# Patient Record
Sex: FEMALE | Race: WHITE | NOT HISPANIC OR LATINO | URBAN - METROPOLITAN AREA
[De-identification: names, ages, dates, MRNs, and addresses within clinical notes are randomized per-mention and may not be internally consistent; named-entity substitution may affect disease eponyms.]

---

## 2018-05-21 ENCOUNTER — EMERGENCY (EMERGENCY)
Facility: HOSPITAL | Age: 83
LOS: 1 days | Discharge: HOME | End: 2018-05-21
Attending: EMERGENCY MEDICINE
Payer: COMMERCIAL

## 2018-05-21 VITALS
RESPIRATION RATE: 18 BRPM | HEART RATE: 82 BPM | SYSTOLIC BLOOD PRESSURE: 128 MMHG | OXYGEN SATURATION: 97 % | TEMPERATURE: 98 F | DIASTOLIC BLOOD PRESSURE: 60 MMHG

## 2018-05-21 VITALS
HEART RATE: 110 BPM | WEIGHT: 80.03 LBS | DIASTOLIC BLOOD PRESSURE: 69 MMHG | OXYGEN SATURATION: 95 % | TEMPERATURE: 98 F | SYSTOLIC BLOOD PRESSURE: 117 MMHG | RESPIRATION RATE: 24 BRPM

## 2018-05-21 DIAGNOSIS — R11.10 VOMITING, UNSPECIFIED: ICD-10-CM

## 2018-05-21 LAB
ALBUMIN SERPL ELPH-MCNC: 2.8 G/DL — LOW (ref 3.3–5)
ALP SERPL-CCNC: 80 U/L — SIGNIFICANT CHANGE UP (ref 40–120)
ALT FLD-CCNC: 11 U/L — LOW (ref 12–78)
ANION GAP SERPL CALC-SCNC: 7 MMOL/L — SIGNIFICANT CHANGE UP (ref 5–17)
APTT BLD: 25.2 SEC — LOW (ref 27.5–37.4)
AST SERPL-CCNC: 33 U/L — SIGNIFICANT CHANGE UP (ref 15–37)
BASOPHILS # BLD AUTO: 0.1 K/UL — SIGNIFICANT CHANGE UP (ref 0–0.2)
BASOPHILS NFR BLD AUTO: 0.6 % — SIGNIFICANT CHANGE UP (ref 0–2)
BILIRUB SERPL-MCNC: 0.5 MG/DL — SIGNIFICANT CHANGE UP (ref 0.2–1.2)
BUN SERPL-MCNC: 20 MG/DL — SIGNIFICANT CHANGE UP (ref 7–23)
CALCIUM SERPL-MCNC: 8.6 MG/DL — SIGNIFICANT CHANGE UP (ref 8.5–10.1)
CHLORIDE SERPL-SCNC: 95 MMOL/L — LOW (ref 96–108)
CO2 SERPL-SCNC: 29 MMOL/L — SIGNIFICANT CHANGE UP (ref 22–31)
CREAT SERPL-MCNC: 0.56 MG/DL — SIGNIFICANT CHANGE UP (ref 0.5–1.3)
EOSINOPHIL # BLD AUTO: 0.1 K/UL — SIGNIFICANT CHANGE UP (ref 0–0.5)
EOSINOPHIL NFR BLD AUTO: 0.8 % — SIGNIFICANT CHANGE UP (ref 0–6)
GLUCOSE SERPL-MCNC: 99 MG/DL — SIGNIFICANT CHANGE UP (ref 70–99)
HCT VFR BLD CALC: 39.2 % — SIGNIFICANT CHANGE UP (ref 34.5–45)
HGB BLD-MCNC: 13.2 G/DL — SIGNIFICANT CHANGE UP (ref 11.5–15.5)
INR BLD: 1.13 RATIO — SIGNIFICANT CHANGE UP (ref 0.88–1.16)
LYMPHOCYTES # BLD AUTO: 0.8 K/UL — LOW (ref 1–3.3)
LYMPHOCYTES # BLD AUTO: 8.5 % — LOW (ref 13–44)
MCHC RBC-ENTMCNC: 30 PG — SIGNIFICANT CHANGE UP (ref 27–34)
MCHC RBC-ENTMCNC: 33.5 GM/DL — SIGNIFICANT CHANGE UP (ref 32–36)
MCV RBC AUTO: 89.3 FL — SIGNIFICANT CHANGE UP (ref 80–100)
MONOCYTES # BLD AUTO: 0.9 K/UL — SIGNIFICANT CHANGE UP (ref 0–0.9)
MONOCYTES NFR BLD AUTO: 9.8 % — HIGH (ref 1–9)
NEUTROPHILS # BLD AUTO: 7.6 K/UL — HIGH (ref 1.8–7.4)
NEUTROPHILS NFR BLD AUTO: 80.2 % — HIGH (ref 43–77)
PLATELET # BLD AUTO: 281 K/UL — SIGNIFICANT CHANGE UP (ref 150–400)
POTASSIUM SERPL-MCNC: 5 MMOL/L — SIGNIFICANT CHANGE UP (ref 3.5–5.3)
POTASSIUM SERPL-SCNC: 5 MMOL/L — SIGNIFICANT CHANGE UP (ref 3.5–5.3)
PROT SERPL-MCNC: 7.4 G/DL — SIGNIFICANT CHANGE UP (ref 6–8.3)
PROTHROM AB SERPL-ACNC: 12.3 SEC — SIGNIFICANT CHANGE UP (ref 9.8–12.7)
RBC # BLD: 4.39 M/UL — SIGNIFICANT CHANGE UP (ref 3.8–5.2)
RBC # FLD: 12.3 % — SIGNIFICANT CHANGE UP (ref 10.3–14.5)
SODIUM SERPL-SCNC: 131 MMOL/L — LOW (ref 135–145)
WBC # BLD: 9.5 K/UL — SIGNIFICANT CHANGE UP (ref 3.8–10.5)
WBC # FLD AUTO: 9.5 K/UL — SIGNIFICANT CHANGE UP (ref 3.8–10.5)

## 2018-05-21 PROCEDURE — 99284 EMERGENCY DEPT VISIT MOD MDM: CPT | Mod: 25

## 2018-05-21 PROCEDURE — 80053 COMPREHEN METABOLIC PANEL: CPT

## 2018-05-21 PROCEDURE — 71045 X-RAY EXAM CHEST 1 VIEW: CPT

## 2018-05-21 PROCEDURE — 93005 ELECTROCARDIOGRAM TRACING: CPT

## 2018-05-21 PROCEDURE — 85730 THROMBOPLASTIN TIME PARTIAL: CPT

## 2018-05-21 PROCEDURE — 99285 EMERGENCY DEPT VISIT HI MDM: CPT

## 2018-05-21 PROCEDURE — 85027 COMPLETE CBC AUTOMATED: CPT

## 2018-05-21 PROCEDURE — 71045 X-RAY EXAM CHEST 1 VIEW: CPT | Mod: 26

## 2018-05-21 PROCEDURE — 85610 PROTHROMBIN TIME: CPT

## 2018-05-21 PROCEDURE — 96374 THER/PROPH/DIAG INJ IV PUSH: CPT

## 2018-05-21 RX ORDER — FAMOTIDINE 10 MG/ML
20 INJECTION INTRAVENOUS ONCE
Qty: 0 | Refills: 0 | Status: DISCONTINUED | OUTPATIENT
Start: 2018-05-21 | End: 2018-05-21

## 2018-05-21 RX ORDER — SODIUM CHLORIDE 9 MG/ML
1000 INJECTION INTRAMUSCULAR; INTRAVENOUS; SUBCUTANEOUS ONCE
Qty: 0 | Refills: 0 | Status: COMPLETED | OUTPATIENT
Start: 2018-05-21 | End: 2018-05-21

## 2018-05-21 RX ORDER — PANTOPRAZOLE SODIUM 20 MG/1
40 TABLET, DELAYED RELEASE ORAL ONCE
Qty: 0 | Refills: 0 | Status: COMPLETED | OUTPATIENT
Start: 2018-05-21 | End: 2018-05-21

## 2018-05-21 RX ADMIN — PANTOPRAZOLE SODIUM 40 MILLIGRAM(S): 20 TABLET, DELAYED RELEASE ORAL at 12:59

## 2018-05-21 RX ADMIN — SODIUM CHLORIDE 1000 MILLILITER(S): 9 INJECTION INTRAMUSCULAR; INTRAVENOUS; SUBCUTANEOUS at 13:59

## 2018-05-21 RX ADMIN — SODIUM CHLORIDE 1000 MILLILITER(S): 9 INJECTION INTRAMUSCULAR; INTRAVENOUS; SUBCUTANEOUS at 12:59

## 2018-05-21 NOTE — ED ADULT NURSE NOTE - OBJECTIVE STATEMENT
has not been to a doctor in 25 years comes in with multiple complaints including "black" vomit 2-3 days ago denies any further vomiting c/o mild nausea, no appetite but can drink and tolerate water well denies diarrhea also c/o b/l upper back pain for about a week that has been intermittent and only comes with activity and movement also c/o pain to b/l plantar feet even with gentle palpation pt has good b/l pedal pulses

## 2018-05-21 NOTE — CONSULT NOTE ADULT - SUBJECTIVE AND OBJECTIVE BOX
Chief Complaint:  Patient is a 83y old  Female who presents with a chief complaint of   No pertinent past medical history  No significant past surgical history     HPI: pending    GI consulted for ? hematemesis. Pt seen and examined, family at bedside. Denies prior hx of GI conditions or other significant pmhx. Reports vomiting which started 3 days ago and ended yesterday. Reports one episode of dark tinged vomitus with phlegm, denies actual coffee ground emesis or brb. Unsure of exactly what she ate. Endorses associated chills and recent sick contacts (great grandkids). Last bm yesterday and normal, brown in color, denies melena/hematochezia. Denies fevers, nausea, abd pain. Upon eval in ED states she feels much better, symptoms resolved, no further episodes of emesis, and that she wants to eat macaroni salad.  prior scopes- denies  abd sx- denies  fhx- denies gi related cancers  toxic habits- denies, former smoker, recently quit  meds- n/c    currently in ed afebrile, tachycardia resolved s/p ivf  no leukocytosis h/h wnl, plts wnl inr 1.13    No Known Allergies            FAMILY HISTORY: see above        Review of Systems:    General:  No wt loss, fevers, chills, night sweats, fatigue  Eyes:  Good vision, no reported pain  ENT:  No sore throat, pain, runny nose, dysphagia  CV:  No pain, palpitations, no lightheadedness  Resp:  No dyspnea, cough, tachypnea, wheezing  GI: see above  :  No pain, bleeding, incontinence, nocturia  Muscle:  No pain, weakness  Neuro:  No weakness, tingling, memory problems  Psych:  No fatigue, insomnia, mood problems, depression  Endocrine:  No polyuria, polydypsia, cold/heat intolerance  Heme:  No petechiae, ecchymosis, easy bruisability  Skin:  No rash, tattoos, scars, edema    Relevant Family History:   n/c    Relevant Social History: n/c      Physical Exam:    Vital Signs:  Vital Signs Last 24 Hrs  T(C): 36.4 (21 May 2018 14:04), Max: 36.4 (21 May 2018 11:39)  T(F): 97.6 (21 May 2018 14:04), Max: 97.6 (21 May 2018 14:04)  HR: 88 (21 May 2018 14:04) (88 - 110)  BP: 126/68 (21 May 2018 14:04) (117/69 - 126/68)  BP(mean): --  RR: 20 (21 May 2018 14:04) (20 - 24)  SpO2: 96% (21 May 2018 14:04) (95% - 96%)  Daily     Daily     General: lying in bed in nad, non toxic appearing, frail  HEENT:  NC/AT,  conjunctivae clear and pink, anicteric  Chest:  grossly cta  Cardiovascular:  Regular rhythm, S1, S2  Abdomen:  Soft, non-tender, non-distended, normoactive bowel sounds  Extremities:  no edema  Skin:  No rash  Neuro/Psych:  Awake alert responds appropriately    Laboratory:                            13.2   9.5   )-----------( 281      ( 21 May 2018 12:52 )             39.2     05-21    131<L>  |  95<L>  |  20  ----------------------------<  99  5.0   |  29  |  0.56    Ca    8.6      21 May 2018 12:52    TPro  7.4  /  Alb  2.8<L>  /  TBili  0.5  /  DBili  x   /  AST  33  /  ALT  11<L>  /  AlkPhos  80  05-21    LIVER FUNCTIONS - ( 21 May 2018 12:52 )  Alb: 2.8 g/dL / Pro: 7.4 g/dL / ALK PHOS: 80 U/L / ALT: 11 U/L / AST: 33 U/L / GGT: x           PT/INR - ( 21 May 2018 12:52 )   PT: 12.3 sec;   INR: 1.13 ratio         PTT - ( 21 May 2018 12:52 )  PTT:25.2 sec      Imaging:

## 2018-05-21 NOTE — ED PROVIDER NOTE - PROGRESS NOTE DETAILS
feels much better after IV fluids, GI seen patient, cleared by Dr. Barkley, patient will f/u as outpatient, tolerating PO

## 2018-05-21 NOTE — ED PROVIDER NOTE - OBJECTIVE STATEMENT
84 yo female no pmhx c/o 1 episode of hematemesis, stating she vomited "black" yesterday, but no episodes today.  No fever/chills.  States she otherwise feels well. PMD Dr. Snowden. No GI.  No dizziness, no syncope. No abdominal pain

## 2018-05-21 NOTE — CONSULT NOTE ADULT - PROBLEM SELECTOR RECOMMENDATION 9
questionable hematemesis  pt/family deny overt coffee grounds or brb  suspect possible gastroenteritis which has resolved based on hx  s/p ivf/pepcid in ED  currently asymptomatic, symptoms resolved, afebrile, vss, h/h stable  d/w dr nugent, can be dcd from gi perspective and follow up as outpatient in GI office   to return to ED for eval if with overt hematemesis, abd pain, melena

## 2018-05-21 NOTE — ED PROVIDER NOTE - CONSTITUTIONAL, MLM
normal... thin appearing, well appearing, awake, alert, oriented to person, place, time/situation and in no apparent distress.

## 2018-05-21 NOTE — CONSULT NOTE ADULT - ASSESSMENT
84yo female with no reported significant pmhx, presented to ED for vomiting. GI consulted for questionable hematemesis.

## 2018-11-20 ENCOUNTER — EMERGENCY (EMERGENCY)
Facility: HOSPITAL | Age: 83
LOS: 1 days | Discharge: HOME | End: 2018-11-20
Attending: EMERGENCY MEDICINE
Payer: MEDICARE

## 2018-11-20 VITALS — DIASTOLIC BLOOD PRESSURE: 87 MMHG | SYSTOLIC BLOOD PRESSURE: 154 MMHG | HEART RATE: 75 BPM | OXYGEN SATURATION: 96 %

## 2018-11-20 VITALS — HEIGHT: 63 IN | WEIGHT: 80.03 LBS

## 2018-11-20 LAB
ALBUMIN SERPL ELPH-MCNC: 3 G/DL — LOW (ref 3.3–5)
ALP SERPL-CCNC: 72 U/L — SIGNIFICANT CHANGE UP (ref 40–120)
ALT FLD-CCNC: 12 U/L — SIGNIFICANT CHANGE UP (ref 12–78)
ANION GAP SERPL CALC-SCNC: 8 MMOL/L — SIGNIFICANT CHANGE UP (ref 5–17)
APTT BLD: 23.3 SEC — LOW (ref 28.5–37)
AST SERPL-CCNC: 15 U/L — SIGNIFICANT CHANGE UP (ref 15–37)
BASOPHILS # BLD AUTO: 0.02 K/UL — SIGNIFICANT CHANGE UP (ref 0–0.2)
BASOPHILS NFR BLD AUTO: 0.4 % — SIGNIFICANT CHANGE UP (ref 0–2)
BILIRUB SERPL-MCNC: 0.3 MG/DL — SIGNIFICANT CHANGE UP (ref 0.2–1.2)
BUN SERPL-MCNC: 15 MG/DL — SIGNIFICANT CHANGE UP (ref 7–23)
CALCIUM SERPL-MCNC: 8.3 MG/DL — LOW (ref 8.5–10.1)
CHLORIDE SERPL-SCNC: 100 MMOL/L — SIGNIFICANT CHANGE UP (ref 96–108)
CO2 SERPL-SCNC: 29 MMOL/L — SIGNIFICANT CHANGE UP (ref 22–31)
CREAT SERPL-MCNC: 0.63 MG/DL — SIGNIFICANT CHANGE UP (ref 0.5–1.3)
EOSINOPHIL # BLD AUTO: 0.06 K/UL — SIGNIFICANT CHANGE UP (ref 0–0.5)
EOSINOPHIL NFR BLD AUTO: 1.1 % — SIGNIFICANT CHANGE UP (ref 0–6)
GLUCOSE SERPL-MCNC: 94 MG/DL — SIGNIFICANT CHANGE UP (ref 70–99)
HCT VFR BLD CALC: 39.3 % — SIGNIFICANT CHANGE UP (ref 34.5–45)
HGB BLD-MCNC: 12.4 G/DL — SIGNIFICANT CHANGE UP (ref 11.5–15.5)
IMM GRANULOCYTES NFR BLD AUTO: 0.4 % — SIGNIFICANT CHANGE UP (ref 0–1.5)
INR BLD: 1.02 RATIO — SIGNIFICANT CHANGE UP (ref 0.88–1.16)
LYMPHOCYTES # BLD AUTO: 0.5 K/UL — LOW (ref 1–3.3)
LYMPHOCYTES # BLD AUTO: 8.8 % — LOW (ref 13–44)
MCHC RBC-ENTMCNC: 27.4 PG — SIGNIFICANT CHANGE UP (ref 27–34)
MCHC RBC-ENTMCNC: 31.6 GM/DL — LOW (ref 32–36)
MCV RBC AUTO: 86.8 FL — SIGNIFICANT CHANGE UP (ref 80–100)
MONOCYTES # BLD AUTO: 0.4 K/UL — SIGNIFICANT CHANGE UP (ref 0–0.9)
MONOCYTES NFR BLD AUTO: 7.1 % — SIGNIFICANT CHANGE UP (ref 2–14)
NEUTROPHILS # BLD AUTO: 4.66 K/UL — SIGNIFICANT CHANGE UP (ref 1.8–7.4)
NEUTROPHILS NFR BLD AUTO: 82.2 % — HIGH (ref 43–77)
NRBC # BLD: 0 /100 WBCS — SIGNIFICANT CHANGE UP (ref 0–0)
PLATELET # BLD AUTO: 266 K/UL — SIGNIFICANT CHANGE UP (ref 150–400)
POTASSIUM SERPL-MCNC: 4.2 MMOL/L — SIGNIFICANT CHANGE UP (ref 3.5–5.3)
POTASSIUM SERPL-SCNC: 4.2 MMOL/L — SIGNIFICANT CHANGE UP (ref 3.5–5.3)
PROT SERPL-MCNC: 7.2 G/DL — SIGNIFICANT CHANGE UP (ref 6–8.3)
PROTHROM AB SERPL-ACNC: 11.6 SEC — SIGNIFICANT CHANGE UP (ref 10–12.9)
RBC # BLD: 4.53 M/UL — SIGNIFICANT CHANGE UP (ref 3.8–5.2)
RBC # FLD: 14.4 % — SIGNIFICANT CHANGE UP (ref 10.3–14.5)
SODIUM SERPL-SCNC: 137 MMOL/L — SIGNIFICANT CHANGE UP (ref 135–145)
WBC # BLD: 5.66 K/UL — SIGNIFICANT CHANGE UP (ref 3.8–10.5)
WBC # FLD AUTO: 5.66 K/UL — SIGNIFICANT CHANGE UP (ref 3.8–10.5)

## 2018-11-20 PROCEDURE — 71275 CT ANGIOGRAPHY CHEST: CPT | Mod: 26

## 2018-11-20 PROCEDURE — 99285 EMERGENCY DEPT VISIT HI MDM: CPT

## 2018-11-20 PROCEDURE — 85730 THROMBOPLASTIN TIME PARTIAL: CPT

## 2018-11-20 PROCEDURE — 36415 COLL VENOUS BLD VENIPUNCTURE: CPT

## 2018-11-20 PROCEDURE — 74174 CTA ABD&PLVS W/CONTRAST: CPT

## 2018-11-20 PROCEDURE — 80053 COMPREHEN METABOLIC PANEL: CPT

## 2018-11-20 PROCEDURE — 85027 COMPLETE CBC AUTOMATED: CPT

## 2018-11-20 PROCEDURE — 71046 X-RAY EXAM CHEST 2 VIEWS: CPT

## 2018-11-20 PROCEDURE — 96374 THER/PROPH/DIAG INJ IV PUSH: CPT | Mod: XU

## 2018-11-20 PROCEDURE — 71046 X-RAY EXAM CHEST 2 VIEWS: CPT | Mod: 26

## 2018-11-20 PROCEDURE — 93005 ELECTROCARDIOGRAM TRACING: CPT

## 2018-11-20 PROCEDURE — 93010 ELECTROCARDIOGRAM REPORT: CPT

## 2018-11-20 PROCEDURE — 85610 PROTHROMBIN TIME: CPT

## 2018-11-20 PROCEDURE — 99284 EMERGENCY DEPT VISIT MOD MDM: CPT | Mod: 25

## 2018-11-20 PROCEDURE — 74174 CTA ABD&PLVS W/CONTRAST: CPT | Mod: 26

## 2018-11-20 PROCEDURE — 71275 CT ANGIOGRAPHY CHEST: CPT

## 2018-11-20 RX ORDER — SODIUM CHLORIDE 9 MG/ML
3 INJECTION INTRAMUSCULAR; INTRAVENOUS; SUBCUTANEOUS ONCE
Qty: 0 | Refills: 0 | Status: COMPLETED | OUTPATIENT
Start: 2018-11-20 | End: 2018-11-20

## 2018-11-20 RX ORDER — SODIUM CHLORIDE 9 MG/ML
1000 INJECTION INTRAMUSCULAR; INTRAVENOUS; SUBCUTANEOUS ONCE
Qty: 0 | Refills: 0 | Status: COMPLETED | OUTPATIENT
Start: 2018-11-20 | End: 2018-11-20

## 2018-11-20 RX ORDER — MORPHINE SULFATE 50 MG/1
2 CAPSULE, EXTENDED RELEASE ORAL ONCE
Qty: 0 | Refills: 0 | Status: DISCONTINUED | OUTPATIENT
Start: 2018-11-20 | End: 2018-11-20

## 2018-11-20 RX ADMIN — MORPHINE SULFATE 2 MILLIGRAM(S): 50 CAPSULE, EXTENDED RELEASE ORAL at 11:48

## 2018-11-20 RX ADMIN — MORPHINE SULFATE 2 MILLIGRAM(S): 50 CAPSULE, EXTENDED RELEASE ORAL at 13:39

## 2018-11-20 RX ADMIN — SODIUM CHLORIDE 3 MILLILITER(S): 9 INJECTION INTRAMUSCULAR; INTRAVENOUS; SUBCUTANEOUS at 11:02

## 2018-11-20 RX ADMIN — SODIUM CHLORIDE 1000 MILLILITER(S): 9 INJECTION INTRAMUSCULAR; INTRAVENOUS; SUBCUTANEOUS at 11:02

## 2018-11-20 NOTE — ED ADULT NURSE NOTE - NSIMPLEMENTINTERV_GEN_ALL_ED
Implemented All Universal Safety Interventions:  [place] to call system. Call bell, personal items and telephone within reach. Instruct patient to call for assistance. Room bathroom lighting operational. Non-slip footwear when patient is off stretcher. Physically safe environment: no spills, clutter or unnecessary equipment. Stretcher in lowest position, wheels locked, appropriate side rails in place.

## 2018-11-20 NOTE — ED PROVIDER NOTE - CARE PLAN
Principal Discharge DX:	Acute right-sided thoracic back pain  Secondary Diagnosis:	Thoracic aortic aneurysm without rupture

## 2018-11-20 NOTE — ED PROVIDER NOTE - PROGRESS NOTE DETAILS
Murali thoracic, Dr Roca, should call [facility] for cardiothoracic. Murali pt, doing well, no acute changes Murali Chaudhry at [facility] cardiothoracic. He went over images on his computer of CTA. States this is chronic, no need for transfer. pt can be dc home, can fu with him in office.   Patient doing well, no acute complaints. Discussed with patient about the nature of the back pain and the importance of outpatient follow-up for continued workup, evaluation and definitive diagnosis.  Discussed back pain and neurologic precautions including numbness, tingling, weakness, fever, and changes in bowel/bladder.  An opportunity to ask questions was given . Understanding of all instructions and precautions was verbalized and the patient will follow-up as outpatient as soon as possible. Patient also understands the possibility of needing additional imaging,. Patient will return with any changes, concerns, or any worsening / persistent symptoms.

## 2018-11-20 NOTE — ED PROVIDER NOTE - MUSCULOSKELETAL, MLM
Spine appears normal, no midline spinal tend (c,t,l). No r lat upper back tend / rash at site of pain. range of motion is not limited, no muscle or joint tenderness

## 2018-11-20 NOTE — ED PROVIDER NOTE - NSFOLLOWUPINSTRUCTIONS_ED_ALL_ED_FT
1) Follow-up with your Primary Medical Doctor or referred doctor. Call today / next business day for prompt follow-up.  2) Return to Emergency room for any worsening or persistent pain, weakness, fever, or any other concerning symptoms.  3) See attached instruction sheets for additional information, including information regarding signs and symptoms to look out for, reasons to seek immediate care and other important instructions.  4) Follow-up with Dr Sorensen [phone]  5) Percocet 0.5 tabs every 6 hours as needed, for pain

## 2018-11-20 NOTE — ED PROVIDER NOTE - CHPI ED SYMPTOMS NEG
no tingling/no motor function loss/no numbness/no constipation/no difficulty bearing weight/no bladder dysfunction/no anorexia/no bowel dysfunction/no fatigue

## 2018-11-20 NOTE — ED PROVIDER NOTE - OBJECTIVE STATEMENT
84 yo F p/w R mid back pain x past ~ 3- 4 days. Inc pain with deep insp. No fall / trauma. NO rad of pain to arms / legs. no numb/ting/focal weak. no fever/chills. no cough/sputum. no numb/ting/focal weak. No abd pain. NO n/v/d. NO agg/allev factors. No other inj or co.

## 2018-11-20 NOTE — ED PROVIDER NOTE - CARE PROVIDER_API CALL
Seb Sorensen), Surgery; Thoracic Surgery  [address] [place] [address]  Phone: [phone]  Fax: [phone]

## 2019-05-16 ENCOUNTER — INPATIENT (INPATIENT)
Facility: HOSPITAL | Age: 84
LOS: 1 days | Discharge: HOME | DRG: 287 | End: 2019-05-18
Attending: GENERAL ACUTE CARE HOSPITAL | Admitting: INTERNAL MEDICINE
Payer: COMMERCIAL

## 2019-05-16 VITALS
TEMPERATURE: 98 F | HEART RATE: 93 BPM | HEIGHT: 62 IN | OXYGEN SATURATION: 95 % | WEIGHT: 72.09 LBS | DIASTOLIC BLOOD PRESSURE: 74 MMHG | SYSTOLIC BLOOD PRESSURE: 116 MMHG | RESPIRATION RATE: 18 BRPM

## 2019-05-16 LAB
ALBUMIN SERPL ELPH-MCNC: 3.3 G/DL — SIGNIFICANT CHANGE UP (ref 3.3–5.2)
ALP SERPL-CCNC: 75 U/L — SIGNIFICANT CHANGE UP (ref 40–120)
ALT FLD-CCNC: 24 U/L — SIGNIFICANT CHANGE UP
ANION GAP SERPL CALC-SCNC: 14 MMOL/L — SIGNIFICANT CHANGE UP (ref 5–17)
APTT BLD: 23.9 SEC — LOW (ref 27.5–36.3)
AST SERPL-CCNC: 43 U/L — HIGH
BASOPHILS # BLD AUTO: 0 K/UL — SIGNIFICANT CHANGE UP (ref 0–0.2)
BASOPHILS NFR BLD AUTO: 0.1 % — SIGNIFICANT CHANGE UP (ref 0–2)
BILIRUB SERPL-MCNC: 0.4 MG/DL — SIGNIFICANT CHANGE UP (ref 0.4–2)
BUN SERPL-MCNC: 13 MG/DL — SIGNIFICANT CHANGE UP (ref 8–20)
CALCIUM SERPL-MCNC: 8.9 MG/DL — SIGNIFICANT CHANGE UP (ref 8.6–10.2)
CHLORIDE SERPL-SCNC: 95 MMOL/L — LOW (ref 98–107)
CO2 SERPL-SCNC: 28 MMOL/L — SIGNIFICANT CHANGE UP (ref 22–29)
CREAT SERPL-MCNC: 0.47 MG/DL — LOW (ref 0.5–1.3)
EOSINOPHIL # BLD AUTO: 0 K/UL — SIGNIFICANT CHANGE UP (ref 0–0.5)
EOSINOPHIL NFR BLD AUTO: 0.1 % — SIGNIFICANT CHANGE UP (ref 0–6)
GLUCOSE SERPL-MCNC: 142 MG/DL — HIGH (ref 70–115)
HCT VFR BLD CALC: 35.8 % — LOW (ref 37–47)
HGB BLD-MCNC: 11.5 G/DL — LOW (ref 12–16)
INR BLD: 1.07 RATIO — SIGNIFICANT CHANGE UP (ref 0.88–1.16)
LIDOCAIN IGE QN: 17 U/L — LOW (ref 22–51)
LYMPHOCYTES # BLD AUTO: 0.6 K/UL — LOW (ref 1–4.8)
LYMPHOCYTES # BLD AUTO: 6.6 % — LOW (ref 20–55)
MAGNESIUM SERPL-MCNC: 1.8 MG/DL — SIGNIFICANT CHANGE UP (ref 1.6–2.6)
MCHC RBC-ENTMCNC: 28.4 PG — SIGNIFICANT CHANGE UP (ref 27–31)
MCHC RBC-ENTMCNC: 32.1 G/DL — SIGNIFICANT CHANGE UP (ref 32–36)
MCV RBC AUTO: 88.4 FL — SIGNIFICANT CHANGE UP (ref 81–99)
MONOCYTES # BLD AUTO: 0.4 K/UL — SIGNIFICANT CHANGE UP (ref 0–0.8)
MONOCYTES NFR BLD AUTO: 4.9 % — SIGNIFICANT CHANGE UP (ref 3–10)
NEUTROPHILS # BLD AUTO: 7.9 K/UL — SIGNIFICANT CHANGE UP (ref 1.8–8)
NEUTROPHILS NFR BLD AUTO: 87.7 % — HIGH (ref 37–73)
PLATELET # BLD AUTO: 250 K/UL — SIGNIFICANT CHANGE UP (ref 150–400)
POTASSIUM SERPL-MCNC: 3.8 MMOL/L — SIGNIFICANT CHANGE UP (ref 3.5–5.3)
POTASSIUM SERPL-SCNC: 3.8 MMOL/L — SIGNIFICANT CHANGE UP (ref 3.5–5.3)
PROT SERPL-MCNC: 6.8 G/DL — SIGNIFICANT CHANGE UP (ref 6.6–8.7)
PROTHROM AB SERPL-ACNC: 12.3 SEC — SIGNIFICANT CHANGE UP (ref 10–12.9)
RBC # BLD: 4.05 M/UL — LOW (ref 4.4–5.2)
RBC # FLD: 15.3 % — SIGNIFICANT CHANGE UP (ref 11–15.6)
SODIUM SERPL-SCNC: 137 MMOL/L — SIGNIFICANT CHANGE UP (ref 135–145)
TROPONIN T SERPL-MCNC: <0.01 NG/ML — SIGNIFICANT CHANGE UP (ref 0–0.06)
TSH SERPL-MCNC: 4.66 UIU/ML — HIGH (ref 0.27–4.2)
WBC # BLD: 9 K/UL — SIGNIFICANT CHANGE UP (ref 4.8–10.8)
WBC # FLD AUTO: 9 K/UL — SIGNIFICANT CHANGE UP (ref 4.8–10.8)

## 2019-05-16 PROCEDURE — 99285 EMERGENCY DEPT VISIT HI MDM: CPT | Mod: 25

## 2019-05-16 PROCEDURE — 93010 ELECTROCARDIOGRAM REPORT: CPT

## 2019-05-16 PROCEDURE — 99053 MED SERV 10PM-8AM 24 HR FAC: CPT

## 2019-05-16 NOTE — ED PROVIDER NOTE - PHYSICAL EXAMINATION
Constitutional : Appears comfortably, talking in full sentences  Head :NC AT , no swelling  Eyes :eomi, no swelling  Mouth :mm moist,  Neck : supple, trachea in midline  Chest :Jorge air entry, symm chest expansion, no distress  Heart :S1 S2 distant  Abdomen :abd soft, non tender  Musc/Skel :ext no swelling, no deformity, no spine tenderness, distal pulses present  Neuro  :AAO 3 no focal deficits Constitutional : Appears comfortably, talking in one to two words  Head :NC AT , no swelling  Eyes :eomi, no swelling  Mouth :mm moist,  Neck : supple, trachea in midline  Chest :Jorge air entry, symm chest expansion, no distress  Heart :S1 S2 distant  Abdomen :abd soft, non tender  Musc/Skel :ext no swelling, no deformity, diffuse symm. muscle atrophy, no spine tenderness, distal pulses present  Neuro  :AAO to present, no nystagmus, b/l UE and LE motor and sensory symm, no focal deficits

## 2019-05-16 NOTE — ED ADULT TRIAGE NOTE - CHIEF COMPLAINT QUOTE
Pt with witnessed syncope at [facility] by daughter who states pt slumped over at machine and then staff started to bag patient, when EMS arrived pt was awake, alert, and at baseline mentation. Pt denies any ailments at this time. Pt is everyday smoker. Pt with witnessed syncope at [facility] by daughter who states pt slumped over at machine and then staff started to bag patient, when EMS arrived pt was awake, alert, and at baseline mentation. Pt denies any ailments at this time. Pt is everyday smoker and daughter states pt with cough now for multiple months.

## 2019-05-16 NOTE — ED PROVIDER NOTE - OBJECTIVE STATEMENT
85 y/o F pt with PMHx heart aneurysm (3), cataract presents to the ED s/p syncopal episode.  [place] when suddenly her R knee "gave out".  Per daughter, pt was [place] the [place], sitting down and talking well, when suddenly pt's head dropped down, daughter thought pt was just looking for something in her purse, however pt was not verbally responding to daughter, daughter states pt's "eyes were fixed", two nurses began CPR, then pt came around.  Pt has never had similar symptoms.  Smoker, no EtOH intake.  Denies CP, SOB, palpitations.  No further acute complaints at this time.  Cardiologist: Dr. Murry Hx source: pt and pt's daughter  85 y/o F pt with PMHx heart aneurysm (3), cataract presents to the ED s/p syncopal episode today.  Pt states she was [place] this [place] when suddenly her R knee "gave out".  Per daughter, pt and daughter were at the casino, pt was sitting down and talking well, when suddenly pt's head dropped down, daughter thought pt was just looking for something in her purse, however pt was not verbally responding to daughter, daughter states pt's "eyes were fixed", two nurses then began CPR, and pt gradually became awake and alert, gradually returned to baseline.  Pt has never had similar symptoms before.  Smoker, no EtOH intake.  Denies CP, SOB, palpitations.  No further acute complaints at this time.  Cardiologist: Dr. Murry Hx source: pt and pt's daughter  85 y/o F pt with PMHx heart aneurysm (3), cataract presents to the ED s/p syncopal episode today.  Pt states she was [place] this [place] when suddenly her R knee "gave out", she does not recall events after that.  Per daughter, pt and daughter were at the casino, pt was sitting down and talking well, when suddenly pt's head dropped down, daughter thought pt was just looking for something in her purse, however pt was not verbally responding to daughter, daughter states pt's "eyes were fixed", two nurses then began CPR, and pt gradually became awake and alert, gradually returned to baseline.  Pt has never had similar symptoms before.  Smoker, no EtOH intake.  Denies CP, SOB, palpitations.  No further acute complaints at this time.  Cardiologist: Dr. Murry Hx source: pt and pt's daughter  83 y/o F pt with PMHx thoracic aneurysm (3cm), cataract presents to the ED s/p syncopal episode today.  Pt states she was at the [place] this evening when suddenly her R knee "gave out", she does not recall events after that.  Per daughter, pt and daughter were at the casino, pt was sitting down and talking well, when suddenly pt's head dropped down, daughter thought pt was just looking for something in her purse, however pt was not verbally responding to daughter, daughter states pt's "eyes were fixed", two nurses then began CPR, and pt gradually became awake and alert, gradually returned to baseline.  Pt has never had similar symptoms before.  Smoker, no EtOH intake.  Denies CP, SOB, palpitations.  No further acute complaints at this time.  Cardiologist: Dr. Murry

## 2019-05-16 NOTE — ED ADULT NURSE NOTE - CHIEF COMPLAINT QUOTE
Pt with witnessed syncope at [facility] by daughter who states pt slumped over at machine and then staff started to bag patient, when EMS arrived pt was awake, alert, and at baseline mentation. Pt denies any ailments at this time. Pt is everyday smoker and daughter states pt with cough now for multiple months.

## 2019-05-16 NOTE — ED ADULT NURSE NOTE - NSIMPLEMENTINTERV_GEN_ALL_ED
Implemented All Fall Risk Interventions:  [place] to call system. Call bell, personal items and telephone within reach. Instruct patient to call for assistance. Room bathroom lighting operational. Non-slip footwear when patient is off stretcher. Physically safe environment: no spills, clutter or unnecessary equipment. Stretcher in lowest position, wheels locked, appropriate side rails in place. Provide visual cue, wrist band, yellow gown, etc. Monitor gait and stability. Monitor for mental status changes and reorient to person, place, and time. Review medications for side effects contributing to fall risk. Reinforce activity limits and safety measures with patient and family.

## 2019-05-16 NOTE — ED PROVIDER NOTE - NS ED ROS FT
no weight change, no fever or chills  no recent travel, no recent abox, no sick contacts  no recent change in medications  no rash, no bruises  no visual changes no eye discharge  no cough cold or congestion,   no sob, no chest pain  follows with cardiology  no stress test, no cath  no orthopnea, no pnd  no abd pain, no n/v/d  no endoscopy, no colonoscopy  no hematuria, no change in urinary habits  no joint pain, no deformity  no headache, no paresthesia no weight change, no fever or chills  no recent travel, no recent abox, no sick contacts  no recent change in medications  no rash, no bruises  no visual changes no eye discharge  no cough cold or congestion,   no sob, no chest pain  (+) syncope  follows with cardiology  no stress test, no cath  no orthopnea, no pnd  no abd pain, no n/v/d  no endoscopy, no colonoscopy  no hematuria, no change in urinary habits  no joint pain, no deformity  no headache, no paresthesia

## 2019-05-16 NOTE — ED PROVIDER NOTE - CLINICAL SUMMARY MEDICAL DECISION MAKING FREE TEXT BOX
83 y/o F 85 y/o F with hx active smoking, thoracic aneurysm (3cm), lives alone, presents with syncope, plan to do CT angio chest/abdomen/pelvis, give fluids, CXR for cough, and admit for observation.

## 2019-05-17 DIAGNOSIS — I71.2 THORACIC AORTIC ANEURYSM, WITHOUT RUPTURE: ICD-10-CM

## 2019-05-17 DIAGNOSIS — H26.9 UNSPECIFIED CATARACT: Chronic | ICD-10-CM

## 2019-05-17 DIAGNOSIS — R55 SYNCOPE AND COLLAPSE: ICD-10-CM

## 2019-05-17 LAB
ANION GAP SERPL CALC-SCNC: 12 MMOL/L — SIGNIFICANT CHANGE UP (ref 5–17)
APPEARANCE UR: CLEAR — SIGNIFICANT CHANGE UP
BACTERIA # UR AUTO: ABNORMAL
BILIRUB UR-MCNC: NEGATIVE — SIGNIFICANT CHANGE UP
BUN SERPL-MCNC: 10 MG/DL — SIGNIFICANT CHANGE UP (ref 8–20)
CALCIUM SERPL-MCNC: 8.1 MG/DL — LOW (ref 8.6–10.2)
CHLORIDE SERPL-SCNC: 96 MMOL/L — LOW (ref 98–107)
CHOLEST SERPL-MCNC: 160 MG/DL — SIGNIFICANT CHANGE UP (ref 110–199)
CK SERPL-CCNC: 37 U/L — SIGNIFICANT CHANGE UP (ref 25–170)
CO2 SERPL-SCNC: 26 MMOL/L — SIGNIFICANT CHANGE UP (ref 22–29)
COLOR SPEC: YELLOW — SIGNIFICANT CHANGE UP
CREAT SERPL-MCNC: 0.33 MG/DL — LOW (ref 0.5–1.3)
DIFF PNL FLD: ABNORMAL
EPI CELLS # UR: SIGNIFICANT CHANGE UP
GLUCOSE SERPL-MCNC: 91 MG/DL — SIGNIFICANT CHANGE UP (ref 70–115)
GLUCOSE UR QL: NEGATIVE MG/DL — SIGNIFICANT CHANGE UP
HBA1C BLD-MCNC: 5.5 % — SIGNIFICANT CHANGE UP (ref 4–5.6)
HDLC SERPL-MCNC: 54 MG/DL — SIGNIFICANT CHANGE UP
KETONES UR-MCNC: NEGATIVE — SIGNIFICANT CHANGE UP
LEUKOCYTE ESTERASE UR-ACNC: ABNORMAL
LIPID PNL WITH DIRECT LDL SERPL: 94 MG/DL — SIGNIFICANT CHANGE UP
MAGNESIUM SERPL-MCNC: 1.8 MG/DL — SIGNIFICANT CHANGE UP (ref 1.6–2.6)
NITRITE UR-MCNC: NEGATIVE — SIGNIFICANT CHANGE UP
PH UR: 6.5 — SIGNIFICANT CHANGE UP (ref 5–8)
POTASSIUM SERPL-MCNC: 3.4 MMOL/L — LOW (ref 3.5–5.3)
POTASSIUM SERPL-SCNC: 3.4 MMOL/L — LOW (ref 3.5–5.3)
PROT UR-MCNC: 30 MG/DL
RBC CASTS # UR COMP ASSIST: ABNORMAL /HPF (ref 0–4)
SODIUM SERPL-SCNC: 134 MMOL/L — LOW (ref 135–145)
SP GR SPEC: 1.01 — SIGNIFICANT CHANGE UP (ref 1.01–1.02)
TOTAL CHOLESTEROL/HDL RATIO MEASUREMENT: 3 RATIO — LOW (ref 3.3–7.1)
TRIGL SERPL-MCNC: 61 MG/DL — SIGNIFICANT CHANGE UP (ref 10–200)
TROPONIN T SERPL-MCNC: <0.01 NG/ML — SIGNIFICANT CHANGE UP (ref 0–0.06)
UROBILINOGEN FLD QL: 4 MG/DL
WBC UR QL: ABNORMAL

## 2019-05-17 PROCEDURE — 93567 NJX CAR CTH SPRVLV AORTGRPHY: CPT | Mod: GC

## 2019-05-17 PROCEDURE — 93458 L HRT ARTERY/VENTRICLE ANGIO: CPT | Mod: 26,GC

## 2019-05-17 PROCEDURE — 93306 TTE W/DOPPLER COMPLETE: CPT | Mod: 26

## 2019-05-17 PROCEDURE — 70450 CT HEAD/BRAIN W/O DYE: CPT | Mod: 26

## 2019-05-17 PROCEDURE — 12345: CPT | Mod: NC

## 2019-05-17 PROCEDURE — 71046 X-RAY EXAM CHEST 2 VIEWS: CPT | Mod: 26

## 2019-05-17 PROCEDURE — 74174 CTA ABD&PLVS W/CONTRAST: CPT | Mod: 26

## 2019-05-17 PROCEDURE — 99223 1ST HOSP IP/OBS HIGH 75: CPT

## 2019-05-17 PROCEDURE — 93010 ELECTROCARDIOGRAM REPORT: CPT

## 2019-05-17 PROCEDURE — 71275 CT ANGIOGRAPHY CHEST: CPT | Mod: 26

## 2019-05-17 RX ORDER — POTASSIUM CHLORIDE 20 MEQ
40 PACKET (EA) ORAL ONCE
Refills: 0 | Status: COMPLETED | OUTPATIENT
Start: 2019-05-17 | End: 2019-05-17

## 2019-05-17 RX ORDER — METOPROLOL TARTRATE 50 MG
50 TABLET ORAL DAILY
Refills: 0 | Status: DISCONTINUED | OUTPATIENT
Start: 2019-05-17 | End: 2019-05-18

## 2019-05-17 RX ORDER — NICOTINE POLACRILEX 2 MG
1 GUM BUCCAL DAILY
Refills: 0 | Status: DISCONTINUED | OUTPATIENT
Start: 2019-05-17 | End: 2019-05-18

## 2019-05-17 RX ORDER — SODIUM CHLORIDE 9 MG/ML
1000 INJECTION INTRAMUSCULAR; INTRAVENOUS; SUBCUTANEOUS
Refills: 0 | Status: DISCONTINUED | OUTPATIENT
Start: 2019-05-17 | End: 2019-05-18

## 2019-05-17 RX ORDER — SODIUM CHLORIDE 9 MG/ML
1000 INJECTION INTRAMUSCULAR; INTRAVENOUS; SUBCUTANEOUS ONCE
Refills: 0 | Status: COMPLETED | OUTPATIENT
Start: 2019-05-17 | End: 2019-05-17

## 2019-05-17 RX ORDER — ASPIRIN/CALCIUM CARB/MAGNESIUM 324 MG
81 TABLET ORAL ONCE
Refills: 0 | Status: COMPLETED | OUTPATIENT
Start: 2019-05-17 | End: 2019-05-17

## 2019-05-17 RX ORDER — ENOXAPARIN SODIUM 100 MG/ML
40 INJECTION SUBCUTANEOUS DAILY
Refills: 0 | Status: DISCONTINUED | OUTPATIENT
Start: 2019-05-17 | End: 2019-05-17

## 2019-05-17 RX ORDER — ENOXAPARIN SODIUM 100 MG/ML
30 INJECTION SUBCUTANEOUS DAILY
Refills: 0 | Status: DISCONTINUED | OUTPATIENT
Start: 2019-05-17 | End: 2019-05-18

## 2019-05-17 RX ORDER — LOSARTAN POTASSIUM 100 MG/1
25 TABLET, FILM COATED ORAL DAILY
Refills: 0 | Status: DISCONTINUED | OUTPATIENT
Start: 2019-05-17 | End: 2019-05-18

## 2019-05-17 RX ADMIN — Medication 81 MILLIGRAM(S): at 12:52

## 2019-05-17 RX ADMIN — SODIUM CHLORIDE 75 MILLILITER(S): 9 INJECTION INTRAMUSCULAR; INTRAVENOUS; SUBCUTANEOUS at 09:34

## 2019-05-17 RX ADMIN — SODIUM CHLORIDE 500 MILLILITER(S): 9 INJECTION INTRAMUSCULAR; INTRAVENOUS; SUBCUTANEOUS at 00:57

## 2019-05-17 RX ADMIN — Medication 40 MILLIEQUIVALENT(S): at 12:52

## 2019-05-17 NOTE — CONSULT NOTE ADULT - SUBJECTIVE AND OBJECTIVE BOX
Patient is a 84y old  Female who presents with a chief complaint of Syncope (17 May 2019 03:22)      HPI: 83 y/o female with PMH cataract surgery, thoracic aortic aneurysm, active smoker admitted for syncopal episode. Pt states R knee gave out, but can not recall events; as per [place] pt [place] speaking with daughter, head dropped down and she stopped responding to them. Her family noticed that her 'eyes looked fixed'.  There were 2 nurses nearby who then placed pt on floor and began CPR without checking for a pulse and after about 5-10 seconds, pt woke up and was talking. Pt states she did not eat or drink anything all day, denies dizziness, lightheaded prior to episode, denies similar events in past. c/o sore throat x 1 mth, productive cough, yellow phlegm, and 10lb weight loss over past two months, "no appetite".  Denies fever, chills, shortness of breath, dyspnea on exertion, orthopnea, PND, edema, chest pain, pressure, palpitations, irregular, fast heart beat, nausea, vomiting, melena, rectal bleed, hematuria, lightheadedness, dizziness.       PAST MEDICAL & SURGICAL HISTORY:  History of aortic aneurysm  No significant past surgical history      Allergies  No Known Allergies  Intolerances        MEDICATIONS  (STANDING):  enoxaparin Injectable 30 milliGRAM(s) SubCutaneous daily  nicotine -   7 mG/24Hr(s) Patch 1 patch Transdermal daily  sodium chloride 0.9%. 1000 milliLiter(s) (75 mL/Hr) IV Continuous <Continuous>    MEDICATIONS  (PRN):      FAMILY HISTORY:  No pertinent family history in first degree relatives      SOCIAL HISTORY:  CIGARETTES: 3-4 cigarettes per day  ALCOHOL: Denies     REVIEW OF SYSTEMS:  CONSTITUTIONAL: No fever, weight loss, or fatigue  EYES: No eye pain, visual disturbances, or discharge  ENMT:  No difficulty hearing, tinnitus, vertigo; No sinus or throat pain  NECK: No pain or stiffness  RESPIRATORY: + COUGH; + SORE THROAT; No wheezing, chills or hemoptysis; No Shortness of Breath  CARDIOVASCULAR: No chest pain, palpitations, passing out, dizziness, or leg swelling  GASTROINTESTINAL: No abdominal or epigastric pain. No nausea, vomiting, or hematemesis; No diarrhea or constipation. No melena or hematochezia.  GENITOURINARY: No dysuria, frequency, hematuria, or incontinence  NEUROLOGICAL: No headaches, memory loss, loss of strength, numbness, or tremors  SKIN: No itching, burning, rashes, or lesions   LYMPH Nodes: No enlarged glands  ENDOCRINE: No heat or cold intolerance; No hair loss  MUSCULOSKELETAL: No joint pain or swelling; No muscle, back, or extremity pain  PSYCHIATRIC: No depression, anxiety, mood swings, or difficulty sleeping  HEME/LYMPH: No easy bruising, or bleeding gums  ALLERGY AND IMMUNOLOGIC: No hives or eczema	    Vital Signs Last 24 Hrs  T(C): 36.5 (17 May 2019 03:09), Max: 36.8 (16 May 2019 22:26)  T(F): 97.7 (17 May 2019 03:09), Max: 98.3 (16 May 2019 22:26)  HR: 100 (17 May 2019 03:09) (93 - 100)  BP: 106/75 (17 May 2019 03:09) (106/75 - 116/74)  RR: 40 (17 May 2019 03:09) (18 - 40)  SpO2: 95% (17 May 2019 03:09) (95% - 95%)    Daily Height in cm: 157.48 (16 May 2019 22:26)          PHYSICAL EXAM:  Appearance: comfortable, thin, elderly female 	  HEENT:  Normal oral mucosa, EOMI, sclera non-icteric	  Lymphatic: No cervical lymphadenopathy  Cardiovascular: Normal S1 S2, No JVD, No cardiac murmurs, No carotid bruits, No peripheral edema  Respiratory: Lungs clear to auscultation	  Psychiatry: A & O x 3, Mood & affect appropriate  Gastrointestinal:  Soft, Non-tender, + BS, no bruits	  Skin: No rashes, No ecchymoses, No cyanosis  Neurologic: Grossly non-focal with full strength in all four extremities  Extremities: Normal range of motion, No clubbing, cyanosis or edema  Vascular: Peripheral pulses palpable 2+ bilaterally      INTERPRETATION OF TELEMETRY: SR with APCs  ECG: SR, TWI V4-V6, septal infarct    LABS:                        11.5   9.0   )-----------( 250      ( 16 May 2019 22:58 )             35.8     05-16    137  |  95<L>  |  13.0  ----------------------------<  142<H>  3.8   |  28.0  |  0.47<L>    Ca    8.9      16 May 2019 22:58  Mg     1.8     05-16    TPro  6.8  /  Alb  3.3  /  TBili  0.4  /  DBili  x   /  AST  43<H>  /  ALT  24  /  AlkPhos  75  05-16    CARDIAC MARKERS ( 16 May 2019 22:58 )  x     / <0.01 ng/mL / x     / x     / x        PT/INR - ( 16 May 2019 22:58 )   PT: 12.3 sec;   INR: 1.07 ratio     PTT - ( 16 May 2019 22:58 )  PTT:23.9 sec    Urinalysis Basic - ( 17 May 2019 03:05 )    Color: Yellow / Appearance: Clear / S.010 / pH: x  Gluc: x / Ketone: Negative  / Bili: Negative / Urobili: 4 mg/dL   Blood: x / Protein: 30 mg/dL / Nitrite: Negative   Leuk Esterase: Small / RBC: 3-5 /HPF / WBC 6-10   Sq Epi: x / Non Sq Epi: Few / Bacteria: Occasional      RADIOLOGY & ADDITIONAL STUDIES:  < from: CT Angio Abdomen and Pelvis w/ IV Cont (19 @ 00:31) >    FINDINGS:    Compared to the recent CT of 2018, there is again seen is a   4.3 cm aneurysm of the a.c. ascending aorta. The aortic arch again   measures up to 4.3 cm with partial thrombosis of the aneurysm. The   thrombosis extends to the left subclavian artery. The left vertebral   artery. The celiac axis, superior mesenteric artery, and right renal   artery are widely patent. There is mild atherosclerotic narrowing of the   left renal artery. Proximally. There is no central pulmonary embolism.     The visualized neck, axilla and subcutaneous tissues are unremarkable.    The tracheobronchial tree is patent proximally.  There is no significant   mediastinal or hilar adenopathy.    The heart is mildly enlarged.  There is no pericardial effusion.    Lungs: Redemonstration of moderate centrilobular emphysematous change.   Again seen is a linear density measuring 1 cm in the left upper lobe   which may be followed up in 3 to months.    There is no pleural abnormality.    There is no free air, fluid or focal collection.      The spleen, pancreas, adrenal glands and kidneys are unremarkable.    Gallbladder: Moderately distended with gallstones.    Moderate hiatal hernia. There is no small bowel obstruction. Appendix not   visualized. Diverticulosis.    Pelvic organs:  Theurinary bladder protrudes into the pelvis.  The aorta is not dilated.  There are atherosclerotic vascular   calcifications.  There is no significant adenopathy.  There is no retroperitoneal mass.  Bones are diffusely osteopenic. Mild to moderate compression deformity of   T8. Mild compression deformity. 12. Mild height loss at L3.      IMPRESSION:    No significant interval change compared to the recent CT of 2018.  No aortic dissection or aortic rupture. Redemonstration of 4.3 cm   aneurysmal dilatation of the ascending aorta and aortic arch. 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  No central pulmonary embolism.  Persistent 1 cm linear opacity in the left upper lobe in the setting of   emphysema. Recommend follow-up in 3 months.  Mild cardiomegaly.  Moderate hiatal hernia.  Moderate diverticulosis.  Gallstones.        JOSEMANUEL BOLANOS M.D, ATTENDING RADIOLOGIST  This document has been electronically signed. May 17 2019  1:14AM    < end of copied text > Patient is a 84y old  Female who presents with a chief complaint of Syncope (17 May 2019 03:22)      HPI: 83 y/o female with PMH cataract surgery, thoracic aortic aneurysm, active smoker admitted for syncopal episode. Pt states R knee gave out, but can not recall events; as per [place] pt [place] speaking with daughter, head dropped down and she stopped responding to them. Her family noticed that her 'eyes looked fixed'.  There were 2 nurses nearby who then placed pt on floor and began CPR without checking for a pulse and after about 5-10 seconds, pt woke up and was talking. Pt states she did not eat or drink anything all day, denies dizziness, lightheaded prior to episode, denies similar events in past. c/o sore throat x 1 mth, productive cough, yellow phlegm, and 10lb weight loss over past two months, "no appetite".  Denies fever, chills, shortness of breath, dyspnea on exertion, orthopnea, PND, edema, chest pain, pressure, palpitations, irregular, fast heart beat, nausea, vomiting, melena, rectal bleed, hematuria, lightheadedness, dizziness.       PAST MEDICAL & SURGICAL HISTORY:  History of aortic aneurysm  No significant past surgical history      Allergies  No Known Allergies  Intolerances        MEDICATIONS  (STANDING):  enoxaparin Injectable 30 milliGRAM(s) SubCutaneous daily  nicotine -   7 mG/24Hr(s) Patch 1 patch Transdermal daily  sodium chloride 0.9%. 1000 milliLiter(s) (75 mL/Hr) IV Continuous <Continuous>    MEDICATIONS  (PRN):      FAMILY HISTORY:  No pertinent family history in first degree relatives      SOCIAL HISTORY:  CIGARETTES: 3-4 cigarettes per day  ALCOHOL: Denies     REVIEW OF SYSTEMS:  CONSTITUTIONAL: No fever, weight loss, or fatigue  EYES: No eye pain, visual disturbances, or discharge  ENMT:  No difficulty hearing, tinnitus, vertigo; No sinus or throat pain  NECK: No pain or stiffness  RESPIRATORY: + COUGH; + SORE THROAT; No wheezing, chills or hemoptysis; No Shortness of Breath  CARDIOVASCULAR: No chest pain, palpitations, passing out, dizziness, or leg swelling  GASTROINTESTINAL: No abdominal or epigastric pain. No nausea, vomiting, or hematemesis; No diarrhea or constipation. No melena or hematochezia.  GENITOURINARY: No dysuria, frequency, hematuria, or incontinence  NEUROLOGICAL: No headaches, memory loss, loss of strength, numbness, or tremors  SKIN: No itching, burning, rashes, or lesions   LYMPH Nodes: No enlarged glands  ENDOCRINE: No heat or cold intolerance; No hair loss  MUSCULOSKELETAL: No joint pain or swelling; No muscle, back, or extremity pain  PSYCHIATRIC: No depression, anxiety, mood swings, or difficulty sleeping  HEME/LYMPH: No easy bruising, or bleeding gums  ALLERGY AND IMMUNOLOGIC: No hives or eczema	    Vital Signs Last 24 Hrs  T(C): 36.5 (17 May 2019 03:09), Max: 36.8 (16 May 2019 22:26)  T(F): 97.7 (17 May 2019 03:09), Max: 98.3 (16 May 2019 22:26)  HR: 100 (17 May 2019 03:09) (93 - 100)  BP: 106/75 (17 May 2019 03:09) (106/75 - 116/74)  RR: 40 (17 May 2019 03:09) (18 - 40)  SpO2: 95% (17 May 2019 03:09) (95% - 95%)    Daily Height in cm: 157.48 (16 May 2019 22:26)          PHYSICAL EXAM:  Appearance: comfortable, thin, elderly female 	  HEENT:  Normal oral mucosa, EOMI, sclera non-icteric	  Lymphatic: No cervical lymphadenopathy  Cardiovascular: Normal S1 S2, frequent APCs, No JVD, No cardiac murmurs, L carotid bruits, No peripheral edema  Respiratory: Lungs clear to auscultation	  Psychiatry: A & O x 3, Mood & affect appropriate  Gastrointestinal:  Soft, Non-tender, + BS, no bruits	  Skin: No rashes, No ecchymoses, No cyanosis  Neurologic: Grossly non-focal with full strength in all four extremities  Extremities: Normal range of motion, No clubbing, cyanosis or edema  Vascular: Peripheral pulses palpable 2+ bilaterally      INTERPRETATION OF TELEMETRY: SR with APCs  ECG: SR, TWI V4-V6, septal infarct    LABS:                        11.5   9.0   )-----------( 250      ( 16 May 2019 22:58 )             35.8     05-16    137  |  95<L>  |  13.0  ----------------------------<  142<H>  3.8   |  28.0  |  0.47<L>    Ca    8.9      16 May 2019 22:58  Mg     1.8     05-16    TPro  6.8  /  Alb  3.3  /  TBili  0.4  /  DBili  x   /  AST  43<H>  /  ALT  24  /  AlkPhos  75  05-16    CARDIAC MARKERS ( 16 May 2019 22:58 )  x     / <0.01 ng/mL / x     / x     / x        PT/INR - ( 16 May 2019 22:58 )   PT: 12.3 sec;   INR: 1.07 ratio     PTT - ( 16 May 2019 22:58 )  PTT:23.9 sec    Urinalysis Basic - ( 17 May 2019 03:05 )    Color: Yellow / Appearance: Clear / S.010 / pH: x  Gluc: x / Ketone: Negative  / Bili: Negative / Urobili: 4 mg/dL   Blood: x / Protein: 30 mg/dL / Nitrite: Negative   Leuk Esterase: Small / RBC: 3-5 /HPF / WBC 6-10   Sq Epi: x / Non Sq Epi: Few / Bacteria: Occasional      RADIOLOGY & ADDITIONAL STUDIES:  < from: CT Angio Abdomen and Pelvis w/ IV Cont (19 @ 00:31) >    FINDINGS:    Compared to the recent CT of 2018, there is again seen is a   4.3 cm aneurysm of the a.c. ascending aorta. The aortic arch again   measures up to 4.3 cm with partial thrombosis of the aneurysm. The   thrombosis extends to the left subclavian artery. The left vertebral   artery. The celiac axis, superior mesenteric artery, and right renal   artery are widely patent. There is mild atherosclerotic narrowing of the   left renal artery. Proximally. There is no central pulmonary embolism.     The visualized neck, axilla and subcutaneous tissues are unremarkable.    The tracheobronchial tree is patent proximally.  There is no significant   mediastinal or hilar adenopathy.    The heart is mildly enlarged.  There is no pericardial effusion.    Lungs: Redemonstration of moderate centrilobular emphysematous change.   Again seen is a linear density measuring 1 cm in the left upper lobe   which may be followed up in 3 to months.    There is no pleural abnormality.    There is no free air, fluid or focal collection.      The spleen, pancreas, adrenal glands and kidneys are unremarkable.    Gallbladder: Moderately distended with gallstones.    Moderate hiatal hernia. There is no small bowel obstruction. Appendix not   visualized. Diverticulosis.    Pelvic organs:  Theurinary bladder protrudes into the pelvis.  The aorta is not dilated.  There are atherosclerotic vascular   calcifications.  There is no significant adenopathy.  There is no retroperitoneal mass.  Bones are diffusely osteopenic. Mild to moderate compression deformity of   T8. Mild compression deformity. 12. Mild height loss at L3.      IMPRESSION:    No significant interval change compared to the recent CT of 2018.  No aortic dissection or aortic rupture. Redemonstration of 4.3 cm   aneurysmal dilatation of the ascending aorta and aortic arch. 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  No central pulmonary embolism.  Persistent 1 cm linear opacity in the left upper lobe in the setting of   emphysema. Recommend follow-up in 3 months.  Mild cardiomegaly.  Moderate hiatal hernia.  Moderate diverticulosis.  Gallstones.        JOSEMANUEL BOLANOS M.D, ATTENDING RADIOLOGIST  This document has been electronically signed. May 17 2019  1:14AM    < end of copied text > Patient is a 84y old  Female who presents with a chief complaint of Syncope (17 May 2019 03:22)    HPI: 83 y/o female with PMH cataract surgery, thoracic aortic aneurysm, active smoker admitted for syncopal episode. Pt states R knee gave out, but can not recall events; as per [place] pt [place] speaking with daughter, head dropped down and she stopped responding to them. Her family noticed that her 'eyes looked fixed'.  There were 2 nurses nearby who then placed pt on floor and began CPR without checking for a pulse and after about 5-10 seconds, pt woke up and was talking. Pt states she did not eat or drink anything all day, denies dizziness, lightheaded prior to episode, denies similar events in past. c/o sore throat x 1 mth, productive cough, yellow phlegm, and 10lb weight loss over past two months, "no appetite".  Denies fever, chills, shortness of breath, dyspnea on exertion, orthopnea, PND, edema, chest pain, pressure, palpitations, irregular, fast heart beat, nausea, vomiting, melena, rectal bleed, hematuria, lightheadedness, dizziness.       PAST MEDICAL & SURGICAL HISTORY:  History of aortic aneurysm  No significant past surgical history      Allergies  No Known Allergies  Intolerances    MEDICATIONS  (STANDING):  enoxaparin Injectable 30 milliGRAM(s) SubCutaneous daily  nicotine -   7 mG/24Hr(s) Patch 1 patch Transdermal daily  sodium chloride 0.9%. 1000 milliLiter(s) (75 mL/Hr) IV Continuous <Continuous>    FAMILY HISTORY:  No pertinent family history in first degree relatives    SOCIAL HISTORY:  CIGARETTES: 3-4 cigarettes per day  ALCOHOL: Denies     REVIEW OF SYSTEMS:  CONSTITUTIONAL: No fever, weight loss, or fatigue  EYES: No eye pain, visual disturbances, or discharge  ENMT:  No difficulty hearing, tinnitus, vertigo; No sinus or throat pain  NECK: No pain or stiffness  RESPIRATORY: + COUGH; + SORE THROAT; No wheezing, chills or hemoptysis; No Shortness of Breath  CARDIOVASCULAR: No chest pain, palpitations, passing out, dizziness, or leg swelling  GASTROINTESTINAL: No abdominal or epigastric pain. No nausea, vomiting, or hematemesis; No diarrhea or constipation. No melena or hematochezia.  GENITOURINARY: No dysuria, frequency, hematuria, or incontinence  NEUROLOGICAL: No headaches, memory loss, loss of strength, numbness, or tremors  SKIN: No itching, burning, rashes, or lesions   LYMPH Nodes: No enlarged glands  ENDOCRINE: No heat or cold intolerance; No hair loss  MUSCULOSKELETAL: No joint pain or swelling; No muscle, back, or extremity pain  PSYCHIATRIC: No depression, anxiety, mood swings, or difficulty sleeping  HEME/LYMPH: No easy bruising, or bleeding gums  ALLERGY AND IMMUNOLOGIC: No hives or eczema	    Vital Signs Last 24 Hrs  T(C): 36.5 (17 May 2019 03:09), Max: 36.8 (16 May 2019 22:26)  T(F): 97.7 (17 May 2019 03:09), Max: 98.3 (16 May 2019 22:26)  HR: 100 (17 May 2019 03:09) (93 - 100)  BP: 106/75 (17 May 2019 03:09) (106/75 - 116/74)  RR: 40 (17 May 2019 03:09) (18 - 40)  SpO2: 95% (17 May 2019 03:09) (95% - 95%)    Daily Height in cm: 157.48 (16 May 2019 22:26)      PHYSICAL EXAM:  Appearance: comfortable, thin, elderly female 	  HEENT:  Normal oral mucosa, EOMI, sclera non-icteric	  Lymphatic: No cervical lymphadenopathy  Cardiovascular: Normal S1 S2, frequent APCs, No JVD, No cardiac murmurs, L carotid bruits, No peripheral edema  Respiratory: Lungs clear to auscultation	  Psychiatry: A & O x 3, Mood & affect appropriate  Gastrointestinal:  Soft, Non-tender, + BS, no bruits	  Skin: No rashes, No ecchymoses, No cyanosis  Neurologic: Grossly non-focal with full strength in all four extremities  Extremities: Normal range of motion, No clubbing, cyanosis or edema  Vascular: Peripheral pulses palpable 2+ bilaterally    INTERPRETATION OF TELEMETRY: SR with APCs  ECG: SR, TWI V4-V6, septal infarct    LABS:                        11.5   9.0   )-----------( 250      ( 16 May 2019 22:58 )             35.8     05-16    137  |  95<L>  |  13.0  ----------------------------<  142<H>  3.8   |  28.0  |  0.47<L>    Ca    8.9      16 May 2019 22:58  Mg     1.8     05-16    TPro  6.8  /  Alb  3.3  /  TBili  0.4  /  DBili  x   /  AST  43<H>  /  ALT  24  /  AlkPhos  75  05-16    CARDIAC MARKERS ( 16 May 2019 22:58 )  x     / <0.01 ng/mL / x     / x     / x        PT/INR - ( 16 May 2019 22:58 )   PT: 12.3 sec;   INR: 1.07 ratio     PTT - ( 16 May 2019 22:58 )  PTT:23.9 sec    Urinalysis Basic - ( 17 May 2019 03:05 )    Color: Yellow / Appearance: Clear / S.010 / pH: x  Gluc: x / Ketone: Negative  / Bili: Negative / Urobili: 4 mg/dL   Blood: x / Protein: 30 mg/dL / Nitrite: Negative   Leuk Esterase: Small / RBC: 3-5 /HPF / WBC 6-10   Sq Epi: x / Non Sq Epi: Few / Bacteria: Occasional      RADIOLOGY & ADDITIONAL STUDIES:  < from: CT Angio Abdomen and Pelvis w/ IV Cont (19 @ 00:31) >    FINDINGS:    Compared to the recent CT of 2018, there is again seen is a   4.3 cm aneurysm of the a.c. ascending aorta. The aortic arch again   measures up to 4.3 cm with partial thrombosis of the aneurysm. The   thrombosis extends to the left subclavian artery. The left vertebral   artery. The celiac axis, superior mesenteric artery, and right renal   artery are widely patent. There is mild atherosclerotic narrowing of the   left renal artery. Proximally. There is no central pulmonary embolism.     The visualized neck, axilla and subcutaneous tissues are unremarkable.    The tracheobronchial tree is patent proximally.  There is no significant   mediastinal or hilar adenopathy.    The heart is mildly enlarged.  There is no pericardial effusion.    Lungs: Redemonstration of moderate centrilobular emphysematous change.   Again seen is a linear density measuring 1 cm in the left upper lobe   which may be followed up in 3 to months.    There is no pleural abnormality.    There is no free air, fluid or focal collection.      The spleen, pancreas, adrenal glands and kidneys are unremarkable.    Gallbladder: Moderately distended with gallstones.    Moderate hiatal hernia. There is no small bowel obstruction. Appendix not   visualized. Diverticulosis.    Pelvic organs:  Theurinary bladder protrudes into the pelvis.  The aorta is not dilated.  There are atherosclerotic vascular   calcifications.  There is no significant adenopathy.  There is no retroperitoneal mass.  Bones are diffusely osteopenic. Mild to moderate compression deformity of   T8. Mild compression deformity. 12. Mild height loss at L3.      IMPRESSION:    No significant interval change compared to the recent CT of 2018.  No aortic dissection or aortic rupture. Redemonstration of 4.3 cm   aneurysmal dilatation of the ascending aorta and aortic arch. 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  No central pulmonary embolism.  Persistent 1 cm linear opacity in the left upper lobe in the setting of   emphysema. Recommend follow-up in 3 months.  Mild cardiomegaly.  Moderate hiatal hernia.  Moderate diverticulosis.  Gallstones.        JOSEMANUEL BOLANOS M.D, ATTENDING RADIOLOGIST  This document has been electronically signed. May 17 2019  1:14AM    < end of copied text >

## 2019-05-17 NOTE — CONSULT NOTE ADULT - ASSESSMENT
83 y/o female with PMH cataract surgery, thoracic aortic aneurysm, active smoker admitted for syncopal episode. c/o sore throat x 1 mth, productive cough, yellow phlegm, and 10lb weight loss over past two months, "no appetite".      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   - EKG- SR, LAD, septal infarct, TWI V4-V6 (changed from Nov.18)  - troponin neg x1, repeat pending  - orthostatics pending; IVF infusing  - TTE pending    full consult to follow 85 y/o female with PMH cataract surgery, thoracic aortic aneurysm, active smoker admitted for syncopal episode. c/o sore throat x 1 mth, productive cough, yellow phlegm, and 10lb weight loss over past two months, "no appetite".      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   - EKG- SR, LAD, septal infarct, TWI V4-V6 (changed from Nov.18)  - troponin neg x1, repeat pending  - orthostatics pending; IVF infusing  - TTE preliminary read- EF 30-35%  - Need ischemic work-up- [facility] scheduled for today.     Aneurysm   -CT scan as above  -Ct surgery consult for evaluation aneurysm/thrombus 83 y/o female with PMH cataract surgery, thoracic aortic aneurysm, active smoker admitted for syncopal episode. c/o sore throat x 1 mth, productive cough, yellow phlegm, and 10lb weight loss over past two months, "no appetite".      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   - EKG- SR, LAD, septal infarct, TWI V4-V6 (changed from Nov.18)  - troponin neg x1, repeat pending  - orthostatics pending; IVF infusing  - TTE preliminary read- EF 30-35%  - Need ischemic work-up- [facility] scheduled for today.  - L carotid bruit- US ordered      Aneurysm   -CT scan as above  -Ct surgery consult for evaluation aneurysm/thrombus

## 2019-05-17 NOTE — H&P ADULT - NSHPPHYSICALEXAM_GEN_ALL_CORE
Vital Signs Last 24 Hrs  T(C): 36.5 (17 May 2019 03:09), Max: 36.8 (16 May 2019 22:26)  T(F): 97.7 (17 May 2019 03:09), Max: 98.3 (16 May 2019 22:26)  HR: 100 (17 May 2019 03:09) (93 - 100)  BP: 106/75 (17 May 2019 03:09) (106/75 - 116/74)  BP(mean): --  RR: 40 (17 May 2019 03:09) (18 - 40)  SpO2: 95% (17 May 2019 03:09) (95% - 95%)    GENERAL:  Well-appearing elderly female, not in acute distress  EYES:  Clear conjunctiva, extraocular movement intact  ENT: Somewhat dry mucous membranes  RESP:  Non-labored breathing pattern, lungs clear to ausculation   CV: Regular rate and rhythm, no murmurs appreciated, no lower extremity edema  GI: Soft, non-tender, non-distended  NEURO: Awake, alert, conversant, upper and lower extremity strength 5/5, light touch sensation grossly intact  PSYCH: Calm, cooperative  SKIN: No rash or lesions, warm and dry

## 2019-05-17 NOTE — CONSULT NOTE ADULT - ATTENDING COMMENTS
pt seen and examined.   She has lost weight and appears emaciated.   She had a syncopal event. LVEF is decreased on TTE.   Plan for LHC to exclude CAD and arrythmia as a cause for syncope.   Asked CT surgery and vascular surgery to evaluate her as well.   She is advised not to smoke.  I reviewed the above and agree with a/p.

## 2019-05-17 NOTE — CONSULT NOTE ADULT - ASSESSMENT
84F with history of aortic aneurysm noted by PMD 6 months ago, with plan to "follow" the size by CT scan with PMD. No other cardiac history, s/p syncopal episode. CT revealed aneurysmal dilation of ascending aorta and arch as well as partial thrombosis of descending thoracic aorta, also unchanged since November 2018.    Cath revealed LVEF 35%  RCA 60% stenosis    Overall Plan  Recommend echo > Patient currently refusing to stay overnight and wants to go home to rest.   Will discuss with Dr Stone. 84F with history of aortic aneurysm noted by PMD 6 months ago, with plan to "follow" the size by CT scan with PMD. No other cardiac history, s/p syncopal episode. CT revealed aneurysmal dilation of ascending aorta and arch as well as partial thrombosis of descending thoracic aorta, also unchanged since November 2018.    Cath revealed LVEF 35%  RCA 60% stenosis    Overall Plan  Aortic registry  Carotid dopplers ordered  Will discuss with Dr Stone.

## 2019-05-17 NOTE — CONSULT NOTE ADULT - SUBJECTIVE AND OBJECTIVE BOX
History of Present Illness:  HPI:  84yoF hx thoracic aortic aneurysm, active smoker presenting with syncope.  Collateral hx obtained from daughter Tatiana at bedside ([phone]).  Pt was at [facility] with her family earlier today, was sitting at a table when her head dropped down and she stopped responding to them. Her family noticed that her 'eyes looked fixed'.  There were 2 nurses nearby who then placed pt on floor and began CPR without checking for a pulse and after about 5-10 seconds, pt woke up and was talking.  Pt reports that prior to the episode, she felt her 'knees buckling' a few times while walking to the today.  Pt notes that 'she didn't eat anything all day'.  She denies any lightheadedness, chest pain, dyspnea, abdominal pain, nausea, vomiting prior to the event.  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  No aortic rupture or dissection on imaging.  EKG: NSR, HR 95, TWI in V4 to V6 not seen on previous EKG. (17 May 2019 03:22)      Current Subjective Assessment: Patient with no current dizziness, CP, SOB.    Past Medical History  Diverticulosis  Hiatal hernia  Cholelithiasis  Syncope and collapse  History of aortic aneurysm  Neuropathy      Past Surgical History  Bilateral cataracts  No significant past surgical history    TAKES NO MEDICATIONS AT HOME    MEDICATIONS  (STANDING):  enoxaparin Injectable 30 milliGRAM(s) SubCutaneous daily  losartan 25 milliGRAM(s) Oral daily  metoprolol succinate ER 50 milliGRAM(s) Oral daily  nicotine -   7 mG/24Hr(s) Patch 1 patch Transdermal daily  sodium chloride 0.9%. 1000 milliLiter(s) (75 mL/Hr) IV Continuous <Continuous>    MEDICATIONS  (PRN):  Allergies: No Known Allergies      SOCIAL HISTORY:  Smoker: [x ] Yes  [ ] No        PACK YEARS:  15    (3 cigarettes a day)                   WHEN QUIT?   ETOH use: [ ] Yes  [ x] No              FREQUENCY / QUANTITY:  Ilicit Drug use:  [ ] Yes  [x ] No  Occupation: retired   Live with: alone  Assist device use: none    PMD: Ghassan Snowden MD   Referring Cardiologist: none (Earnestine/Harshil)    Relevant Family History  FAMILY HISTORY:  No pertinent family history in first degree relatives  (Father Parkinsons, Mother, old age)      Review of Systems  GENERAL:  Fevers[] chills[] sweats[] fatigue[x] weight loss[x ] weight gain []                                      [ ] NEGATIVE  NEURO:  parathesias[] seizures []  syncope []  confusion []           Some peripheral neuropathy pain   [ ] NEGATIVE                 EYES: glasses[]  blurry vision[]  discharge[] pain[] glaucoma []      Recent cataracts surgery                      [ ] NEGATIVE                 ENMT:  difficulty hearing []  vertigo[]  dysphagia[] epistaxis[] recent dental work []                     [x ] NEGATIVE                 CV:  chest pain[] palpitations[] GUTHRIE [] diaphoresis [] edema[]                                                           [ x] NEGATIVE                                 RESPIRATORY:  wheezing[] SOB[] cough [x] sputum[thick] hemoptysis[]                                                   [x ] NEGATIVE               GI:  nausea[]  vomiting []  diarrhea[] constipation [] melena []                                                          [x ] NEGATIVE            : hematuria[ ]  dysuria[ ] urgency[] incontinence[]                                                                          [x ] NEGATIVE                    MUSKULOSKELETAL:  arthritis[ ]  joint swelling [ ] muscle weakness [ ]                                            [x ] NEGATIVE                     SKIN/BREAST:  rash[ ] itching [ ]  hair loss[ ] masses[ ]                                                                          [x ] NEGATIVE                     PSYCH:  dementia [ ] depression [ ] anxiety[ ]                                                                                          [x ] NEGATIVE                        HEME/LYMPH:  bruises easily[ ] enlarged lymph nodes[ ] tender lymph nodes[ ]                           [ x] NEGATIVE                      ENDOCRINE:  cold intolerance[ ] heat intolerance[ ] polydipsia[ ]                                                      [ x] NEGATIVE                        Telemetry: SR     PHYSICAL EXAM  Vital Signs Last 24 Hrs  T(C): 36.7 (17 May 2019 12:37), Max: 36.8 (16 May 2019 22:26)  T(F): 98 (17 May 2019 12:37), Max: 98.3 (16 May 2019 22:26)  HR: 84 (17 May 2019 16:45) (76 - 105)  BP: 115/79 (17 May 2019 16:45) (106/75 - 153/75)  BP(mean): 106 (17 May 2019 08:00) (106 - 106)  RR: 15 (17 May 2019 16:45) (15 - 40)  SpO2: 98% (17 May 2019 16:45) (94% - 98%)    General: Cachectic, well developed, no acute distress.                                                         Neuro: Normal exam oriented to person/place & time with no focal motor or sensory  deficits.                    Eyes: Normal exam of conjunctiva & lids, pupils equally reactive.   ENT: Normal exam of nasal/oral mucosa with absence of cyanosis.   Neck: Normal exam of jugular veins, trachea & thyroid.   Chest: Normal lung exam with good air movement absence of wheezes, rales, or rhonchi:                                                                          CV:  Auscultation: normal [ x] S3[ ] S4[ ] Irregular [ ] Rub[ ] Clicks[ ]  Murmurs none:[ x]systolic [ ]  diastolic [ ] holosystolic [ ]  Carotids: No Bruits[x ] Other____________ Abdominal Aorta: normal [ x] nonpalpable[x ]                                                                         GI: Normal exam of abdomen, liver & spleen with no noted masses or tenderness.                                                                                              Extremities: Normal no evidence of cyanosis or deformity Edema: none[x ]trace[ ]1+[ ]2+[ ]3+[ ]4+[ ]  Lower Extremity Pulses: Right[x ] Left[x ]Varicosities[ ]  SKIN : Normal exam to inspection & palpation.                                                           LABS:                        11.5   9.0   )-----------( 250      ( 16 May 2019 22:58 )             35.8     05-    134<L>  |  96<L>  |  10.0  ----------------------------<  91  3.4<L>   |  26.0  |  0.33<L>    Ca    8.1<L>      17 May 2019 07:06  Mg     1.8     -    TPro  6.8  /  Alb  3.3  /  TBili  0.4  /  DBili  x   /  AST  43<H>  /  ALT  24  /  AlkPhos  75  05-16    PT/INR - ( 16 May 2019 22:58 )   PT: 12.3 sec;   INR: 1.07 ratio         PTT - ( 16 May 2019 22:58 )  PTT:23.9 sec  Urinalysis Basic - ( 17 May 2019 03:05 )    Color: Yellow / Appearance: Clear / S.010 / pH: x  Gluc: x / Ketone: Negative  / Bili: Negative / Urobili: 4 mg/dL   Blood: x / Protein: 30 mg/dL / Nitrite: Negative   Leuk Esterase: Small / RBC: 3-5 /HPF / WBC 6-10   Sq Epi: x / Non Sq Epi: Few / Bacteria: Occasional      CARDIAC MARKERS ( 17 May 2019 07:06 )  x     / <0.01 ng/mL / 37 U/L / x     / x      CARDIAC MARKERS ( 16 May 2019 22:58 )  x     / <0.01 ng/mL / x     / x     / x          < from: CT Angio Abdomen and Pelvis w/ IV Cont (19 @ 00:31) >    No significant interval change compared to the recent CT of 2018.  No aortic dissection or aortic rupture. Redemonstration of 4.3 cm   aneurysmal dilatation of the ascending aorta and aortic arch. 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  No central pulmonary embolism.  Persistent 1 cm linear opacity in the left upper lobe in the setting of   emphysema. Recommend follow-up in 3 months.  Mild cardiomegaly.  Moderate hiatal hernia.  Moderate diverticulosis.  Gallstones.    < end of copied text >      < from: CT Head No Cont (19 @ 00:30) >    IMPRESSION:    No intracranial hemorrhage, mass effect or large acute cortical infarct.    < end of copied text >      Cardiac Cath: < from: Cardiac Cath Lab - Adult (19 @ 15:05) >  VENTRICLES: Analysis of regional contractile function demonstrated severe  anterolateral hypokinesis and diaphragmatic akinesis. Global left  ventricular function was moderately depressed. EF estimated was 35 %.  VALVES: AORTIC VALVE: There was mild aortic insufficiency. No significant  aortic valve gradient. MITRAL VALVE: The mitral valve exhibited trivial  regurgitation (less than 1+).  CORONARY VESSELS: The coronary circulation is right dominant.  LM:   --  LM: Normal. The vessel was normal sized, not calcified, and not  tortuous. Angiography showed no evidence of disease.  LAD:   --  LAD: Normal. The vessel was normal sized, not calcified, and not  tortuous. Angiography showed minor luminal irregularities with no flow  limiting lesions.  CX:   --  Circumflex: Normal. The vessel was small sized (non-dominant),  not calcified, and moderately tortuous. Angiography showed minor luminal  irregularities with no flow limiting lesions.  RCA:   --  RCA: Normal. The vessel was normal sized, moderately calcified,  and excessively tortuous. Angiography showed moderate atherosclerosis.  There was a tubular 60 % stenosis in the middle third of the vessel  segment. The lesion was without evidence of thrombus. There was FRANKIE grade  3 flow through the vessel (brisk flow).  AORTA: The root exhibited dilatation.  COMPLICATIONS: There were no complications. No complications occurred  during the cath lab visit.  DIAGNOSTIC IMPRESSIONS: There is significant single vessel coronary artery  disease.  Mid RCA=60% Left ventricular function is abnormal.  LVEF=30-35%  Dilated Ascending Aorta  Minimal Aortic Regurgitation  DIAGNOSTIC RECOMMENDATIONS: The patient should continue with the present  medications. Patient management should include aggressive medical therapy,  close monitoring of BUN and creatinine, resumption of all previous  activities in 2 days, and smoking cessation. Medical management is  recommended.  Prepared and signed by  Yair Chua MD  Signed 2019 16:04:41    < end of copied text > History of Present Illness:  HPI:  84yoF hx thoracic aortic aneurysm, active smoker presenting with syncope.  Collateral hx obtained from daughter Tatiana at bedside ([phone]).  Pt was at [facility] with her family earlier today, was sitting at a table when her head dropped down and she stopped responding to them. Her family noticed that her 'eyes looked fixed'.  There were 2 nurses nearby who then placed pt on floor and began CPR without checking for a pulse and after about 5-10 seconds, pt woke up and was talking.  Pt reports that prior to the episode, she felt her 'knees buckling' a few times while walking to the today.  Pt notes that 'she didn't eat anything all day'.  She denies any lightheadedness, chest pain, dyspnea, abdominal pain, nausea, vomiting prior to the event.  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  No aortic rupture or dissection on imaging.  EKG: NSR, HR 95, TWI in V4 to V6 not seen on previous EKG. (17 May 2019 03:22)      Current Subjective Assessment: Patient with no current dizziness, CP, SOB.    Past Medical History  Diverticulosis  Hiatal hernia  Cholelithiasis  Syncope and collapse  History of aortic aneurysm  Neuropathy      Past Surgical History  Bilateral cataracts  No significant past surgical history    TAKES NO MEDICATIONS AT HOME    MEDICATIONS  (STANDING):  enoxaparin Injectable 30 milliGRAM(s) SubCutaneous daily  losartan 25 milliGRAM(s) Oral daily  metoprolol succinate ER 50 milliGRAM(s) Oral daily  nicotine -   7 mG/24Hr(s) Patch 1 patch Transdermal daily  sodium chloride 0.9%. 1000 milliLiter(s) (75 mL/Hr) IV Continuous <Continuous>    MEDICATIONS  (PRN):  Allergies: No Known Allergies      SOCIAL HISTORY:  Smoker: [x ] Yes  [ ] No        PACK YEARS:  15    (3 cigarettes a day)                   WHEN QUIT?   ETOH use: [ ] Yes  [ x] No              FREQUENCY / QUANTITY:  Ilicit Drug use:  [ ] Yes  [x ] No  Occupation: retired   Live with: alone  Assist device use: none    PMD: Ghassan Snowedn MD   Referring Cardiologist: none (Earnestine/Harshil)    Relevant Family History  FAMILY HISTORY:  No pertinent family history in first degree relatives  (Father Parkinsons, Mother, old age)      Review of Systems  GENERAL:  Fevers[] chills[] sweats[] fatigue[x] weight loss[x ] weight gain []                                      [ ] NEGATIVE  NEURO:  parathesias[] seizures []  syncope []  confusion []           Some peripheral neuropathy pain   [ ] NEGATIVE                 EYES: glasses[]  blurry vision[]  discharge[] pain[] glaucoma []      Recent cataracts surgery                      [ ] NEGATIVE                 ENMT:  difficulty hearing []  vertigo[]  dysphagia[] epistaxis[] recent dental work []                     [x ] NEGATIVE                 CV:  chest pain[] palpitations[] GUTHRIE [] diaphoresis [] edema[]                                                           [ x] NEGATIVE                                 RESPIRATORY:  wheezing[] SOB[] cough [x] sputum[thick] hemoptysis[]                                                   [x ] NEGATIVE               GI:  nausea[]  vomiting []  diarrhea[] constipation [] melena []                                                          [x ] NEGATIVE            : hematuria[ ]  dysuria[ ] urgency[] incontinence[]                                                                          [x ] NEGATIVE                    MUSKULOSKELETAL:  arthritis[ ]  joint swelling [ ] muscle weakness [ ]                                            [x ] NEGATIVE                     SKIN/BREAST:  rash[ ] itching [ ]  hair loss[ ] masses[ ]                                                                          [x ] NEGATIVE                     PSYCH:  dementia [ ] depression [ ] anxiety[ ]                                                                                          [x ] NEGATIVE                        HEME/LYMPH:  bruises easily[ ] enlarged lymph nodes[ ] tender lymph nodes[ ]                           [ x] NEGATIVE                      ENDOCRINE:  cold intolerance[ ] heat intolerance[ ] polydipsia[ ]                                                      [ x] NEGATIVE                        Telemetry: SR     PHYSICAL EXAM  Vital Signs Last 24 Hrs  T(C): 36.7 (17 May 2019 12:37), Max: 36.8 (16 May 2019 22:26)  T(F): 98 (17 May 2019 12:37), Max: 98.3 (16 May 2019 22:26)  HR: 84 (17 May 2019 16:45) (76 - 105)  BP: 115/79 (17 May 2019 16:45) (106/75 - 153/75)  BP(mean): 106 (17 May 2019 08:00) (106 - 106)  RR: 15 (17 May 2019 16:45) (15 - 40)  SpO2: 98% (17 May 2019 16:45) (94% - 98%)    General: Cachectic, well developed, no acute distress.                                                         Neuro: Normal exam oriented to person/place & time with no focal motor or sensory  deficits.                    Eyes: Normal exam of conjunctiva & lids, pupils equally reactive.   ENT: Normal exam of nasal/oral mucosa with absence of cyanosis.   Neck: Normal exam of jugular veins, trachea & thyroid.   Chest: Normal lung exam with good air movement absence of wheezes, rales, or rhonchi:                                                                          CV:  Auscultation: normal [ x] S3[ ] S4[ ] Irregular [ ] Rub[ ] Clicks[ ]  Murmurs none:[ x]systolic [ ]  diastolic [ ] holosystolic [ ]  Carotids: No Bruits[x ] Other____________ Abdominal Aorta: normal [ x] nonpalpable[x ]                                                                         GI: Normal exam of abdomen, liver & spleen with no noted masses or tenderness.                                                                                              Extremities: Normal no evidence of cyanosis or deformity Edema: none[x ]trace[ ]1+[ ]2+[ ]3+[ ]4+[ ]  Lower Extremity Pulses: Right[x ] Left[x ]Varicosities[ ]  SKIN : Normal exam to inspection & palpation.                                                           LABS:                        11.5   9.0   )-----------( 250      ( 16 May 2019 22:58 )             35.8     05-    134<L>  |  96<L>  |  10.0  ----------------------------<  91  3.4<L>   |  26.0  |  0.33<L>    Ca    8.1<L>      17 May 2019 07:06  Mg     1.8     -    TPro  6.8  /  Alb  3.3  /  TBili  0.4  /  DBili  x   /  AST  43<H>  /  ALT  24  /  AlkPhos  75  05-16    PT/INR - ( 16 May 2019 22:58 )   PT: 12.3 sec;   INR: 1.07 ratio         PTT - ( 16 May 2019 22:58 )  PTT:23.9 sec  Urinalysis Basic - ( 17 May 2019 03:05 )    Color: Yellow / Appearance: Clear / S.010 / pH: x  Gluc: x / Ketone: Negative  / Bili: Negative / Urobili: 4 mg/dL   Blood: x / Protein: 30 mg/dL / Nitrite: Negative   Leuk Esterase: Small / RBC: 3-5 /HPF / WBC 6-10   Sq Epi: x / Non Sq Epi: Few / Bacteria: Occasional      CARDIAC MARKERS ( 17 May 2019 07:06 )  x     / <0.01 ng/mL / 37 U/L / x     / x      CARDIAC MARKERS ( 16 May 2019 22:58 )  x     / <0.01 ng/mL / x     / x     / x          < from: CT Angio Abdomen and Pelvis w/ IV Cont (19 @ 00:31) >    No significant interval change compared to the recent CT of 2018.  No aortic dissection or aortic rupture. Redemonstration of 4.3 cm   aneurysmal dilatation of the ascending aorta and aortic arch. 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  No central pulmonary embolism.  Persistent 1 cm linear opacity in the left upper lobe in the setting of   emphysema. Recommend follow-up in 3 months.  Mild cardiomegaly.  Moderate hiatal hernia.  Moderate diverticulosis.  Gallstones.    < end of copied text >      < from: CT Head No Cont (19 @ 00:30) >    IMPRESSION:    No intracranial hemorrhage, mass effect or large acute cortical infarct.    < end of copied text >    < from: TTE Echo Complete w/Doppler (19 @ 10:20) >    Summary:   1. Left ventricular ejection fraction, by visual estimation, is 25 to   30%.   2. Severely decreased global left ventricular systolic function.   3. Spectral Doppler shows impaired relaxation pattern of left   ventricular myocardial filling (Grade I diastolic dysfunction).   4. Normal right ventricular size and function.   5. Moderately enlarged leftatrium.   6. Trace mitral valve regurgitation.   7. Mild-moderate tricuspid regurgitation.   8. Dilatation of the ascending aorta.   9. Estimated pulmonary artery systolic pressure is 40.7 mmHg assuming a   right atrial pressure of 3 mmHg, which is consistent with mild pulmonary   hypertension.  10. There is no evidence of pericardial effusion.  11. Endocardial visualization was enhanced with intravenous echo contrast.    < end of copied text >      Cardiac Cath: < from: Cardiac Cath Lab - Adult (19 @ 15:05) >  VENTRICLES: Analysis of regional contractile function demonstrated severe  anterolateral hypokinesis and diaphragmatic akinesis. Global left  ventricular function was moderately depressed. EF estimated was 35 %.  VALVES: AORTIC VALVE: There was mild aortic insufficiency. No significant  aortic valve gradient. MITRAL VALVE: The mitral valve exhibited trivial  regurgitation (less than 1+).  CORONARY VESSELS: The coronary circulation is right dominant.  LM:   --  LM: Normal. The vessel was normal sized, not calcified, and not  tortuous. Angiography showed no evidence of disease.  LAD:   --  LAD: Normal. The vessel was normal sized, not calcified, and not  tortuous. Angiography showed minor luminal irregularities with no flow  limiting lesions.  CX:   --  Circumflex: Normal. The vessel was small sized (non-dominant),  not calcified, and moderately tortuous. Angiography showed minor luminal  irregularities with no flow limiting lesions.  RCA:   --  RCA: Normal. The vessel was normal sized, moderately calcified,  and excessively tortuous. Angiography showed moderate atherosclerosis.  There was a tubular 60 % stenosis in the middle third of the vessel  segment. The lesion was without evidence of thrombus. There was FRANKIE grade  3 flow through the vessel (brisk flow).  AORTA: The root exhibited dilatation.  COMPLICATIONS: There were no complications. No complications occurred  during the cath lab visit.  DIAGNOSTIC IMPRESSIONS: There is significant single vessel coronary artery  disease.  Mid RCA=60% Left ventricular function is abnormal.  LVEF=30-35%  Dilated Ascending Aorta  Minimal Aortic Regurgitation  DIAGNOSTIC RECOMMENDATIONS: The patient should continue with the present  medications. Patient management should include aggressive medical therapy,  close monitoring of BUN and creatinine, resumption of all previous  activities in 2 days, and smoking cessation. Medical management is  recommended.  Prepared and signed by  Yair Chua MD  Signed 2019 16:04:41    < end of copied text >

## 2019-05-17 NOTE — CONSULT NOTE ADULT - ASSESSMENT
A/P:    -Patient seen and examined by me, exam history and images discussed with Dr. Chin  -Aneurysm is stable since November. Agree with following up with PMD outpatient CT scans Q6H, as she has been doing  -No acute surgical intervention necessary from vascular surgery standpoint.  -Continue syncopal workup per primary team  Thank you for allowing us to participate in your patient's care; PLease reconsult as necessary

## 2019-05-17 NOTE — H&P ADULT - HISTORY OF PRESENT ILLNESS
84yoF hx thoracic aortic aneurysm, active smoker presenting with syncope.  Collateral hx obtained from daughter Tatiana at bedside ([phone]).  Pt was at [facility] with her family earlier today, was sitting at a table when her head dropped down and she stopped responding to them. Her family noticed that her 'eyes looked fixed'.  There were 2 nurses nearby who then placed pt on floor and began CPR without checking for a pulse and after about 5-10 seconds, pt woke up and was responding.  Pt reports that prior to the episode, she felt her 'knees buckling' while walking to the today.  Pt notes that 'she didn't eat anything all day'.  She denies any lightheadedness, chest pain, dyspnea, abdominal pain, nausea, vomiting prior to the event.  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  No aortic rupture or dissection on imaging. 84yoF hx thoracic aortic aneurysm, active smoker presenting with syncope.  Collateral hx obtained from daughter Tatiana at bedside ([phone]).  Pt was at [facility] with her family earlier today, was sitting at a table when her head dropped down and she stopped responding to them. Her family noticed that her 'eyes looked fixed'.  There were 2 nurses nearby who then placed pt on floor and began CPR without checking for a pulse and after about 5-10 seconds, pt woke up and was responding.  Pt reports that prior to the episode, she felt her 'knees buckling' while walking to the today.  Pt notes that 'she didn't eat anything all day'.  She denies any lightheadedness, chest pain, dyspnea, abdominal pain, nausea, vomiting prior to the event.  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  No aortic rupture or dissection on imaging.  EKG: NSR, HR 95, TWI in V4 to V6 not seen on previous EKG. 84yoF hx thoracic aortic aneurysm, active smoker presenting with syncope.  Collateral hx obtained from daughter Tatiana at bedside ([phone]).  Pt was at [facility] with her family earlier today, was sitting at a table when her head dropped down and she stopped responding to them. Her family noticed that her 'eyes looked fixed'.  There were 2 nurses nearby who then placed pt on floor and began CPR without checking for a pulse and after about 5-10 seconds, pt woke up and was talking.  Pt reports that prior to the episode, she felt her 'knees buckling' a few times while walking to the today.  Pt notes that 'she didn't eat anything all day'.  She denies any lightheadedness, chest pain, dyspnea, abdominal pain, nausea, vomiting prior to the event.  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  No aortic rupture or dissection on imaging.  EKG: NSR, HR 95, TWI in V4 to V6 not seen on previous EKG.

## 2019-05-17 NOTE — ASU PATIENT PROFILE, ADULT - PMH
Cholelithiasis    Diverticulosis    Hiatal hernia    History of aortic aneurysm    Syncope and collapse

## 2019-05-17 NOTE — PROGRESS NOTE ADULT - SUBJECTIVE AND OBJECTIVE BOX
DINA GUSTAFSON    [id number]    84y      Female    CC: syncope    HPI : Pt was gambling all day, did not eat/drink, passed out. Daughter was with her at the time, THere was a RN at the time, who did CPR and revived back.   she had [facility] today  denies any symptoms now or even prior to the episode    INTERVAL HPI/OVERNIGHT EVENTS: as above    REVIEW OF SYSTEMS:    CONSTITUTIONAL: No fever, weight loss, or fatigue  RESPIRATORY: No cough, wheezing, hemoptysis; No shortness of breath  CARDIOVASCULAR: No chest pain, palpitations  GASTROINTESTINAL: No abdominal or epigastric pain. No nausea, vomiting  NEUROLOGICAL: No headaches, memory loss, loss of strength.  MISCELLANEOUS:      Vital Signs Last 24 Hrs  T(C): 36.7 (17 May 2019 12:37), Max: 36.8 (16 May 2019 22:26)  T(F): 98 (17 May 2019 12:37), Max: 98.3 (16 May 2019 22:26)  HR: 76 (17 May 2019 16:00) (76 - 105)  BP: 153/75 (17 May 2019 16:00) (106/75 - 153/75)  BP(mean): 106 (17 May 2019 08:00) (106 - 106)  RR: 16 (17 May 2019 16:00) (16 - 40)  SpO2: 95% (17 May 2019 16:00) (94% - 96%)    PHYSICAL EXAM:    GENERAL: NAD, frail, elderly, cachexia, loss of subcut fat  HEENT: PERRL, +EOMI  NECK: soft, Supple  CHEST/LUNG: Clear but poor AE to percussion bilaterally; No wheezing  HEART: S1S2+, Regular rate and rhythm; No murmurs, rubs  ABDOMEN: Soft, Nontender, Nondistended; Bowel sounds present  EXTREMITIES:  No clubbing, cyanosis, or edema  SKIN: No rashes or lesions  NEURO: AAOX3        LABS:                        11.5   9.0   )-----------( 250      ( 16 May 2019 22:58 )             35.8     05-17    134<L>  |  96<L>  |  10.0  ----------------------------<  91  3.4<L>   |  26.0  |  0.33<L>    Ca    8.1<L>      17 May 2019 07:06  Mg     1.8     -    TPro  6.8  /  Alb  3.3  /  TBili  0.4  /  DBili  x   /  AST  43<H>  /  ALT  24  /  AlkPhos  75  -    PT/INR - ( 16 May 2019 22:58 )   PT: 12.3 sec;   INR: 1.07 ratio         PTT - ( 16 May 2019 22:58 )  PTT:23.9 sec  Urinalysis Basic - ( 17 May 2019 03:05 )    Color: Yellow / Appearance: Clear / S.010 / pH: x  Gluc: x / Ketone: Negative  / Bili: Negative / Urobili: 4 mg/dL   Blood: x / Protein: 30 mg/dL / Nitrite: Negative   Leuk Esterase: Small / RBC: 3-5 /HPF / WBC 6-10   Sq Epi: x / Non Sq Epi: Few / Bacteria: Occasional          MEDICATIONS  (STANDING):  enoxaparin Injectable 30 milliGRAM(s) SubCutaneous daily  losartan 25 milliGRAM(s) Oral daily  metoprolol succinate ER 50 milliGRAM(s) Oral daily  nicotine -   7 mG/24Hr(s) Patch 1 patch Transdermal daily  sodium chloride 0.9%. 1000 milliLiter(s) (75 mL/Hr) IV Continuous <Continuous>    MEDICATIONS  (PRN):      RADIOLOGY & ADDITIONAL TESTS:  CT abd pelvis -    4.3 cm aneurysmal dilatation of the ascending aorta and aortic arch. 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

## 2019-05-17 NOTE — H&P ADULT - NSHPLABSRESULTS_GEN_ALL_CORE
11.5   9.0   )-----------( 250      ( 16 May 2019 22:58 )             35.8       05-16    137  |  95<L>  |  13.0  ----------------------------<  142<H>  3.8   |  28.0  |  0.47<L>    Ca    8.9      16 May 2019 22:58  Mg     1.8     05-16    TPro  6.8  /  Alb  3.3  /  TBili  0.4  /  DBili  x   /  AST  43<H>  /  ALT  24  /  AlkPhos  75  05-16      < from: CT Angio Chest w/ IV Cont (05.17.19 @ 00:30) >    IMPRESSION:    No significant interval change compared to the recent CT of November 20, 2018.  No aortic dissection or aortic rupture. Redemonstration of 4.3 cm   aneurysmal dilatation of the ascending aorta and aortic arch. 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  No central pulmonary embolism.  Persistent 1 cm linear opacity in the left upper lobe in the setting of   emphysema. Recommend follow-up in 3 months.  Mild cardiomegaly.  Moderate hiatal hernia.  Moderate diverticulosis.  Gallstones.    EKG: NSR, HR 95, TWI in V4 to V6 not seen on previous EKG

## 2019-05-17 NOTE — PROGRESS NOTE ADULT - SUBJECTIVE AND OBJECTIVE BOX
Department of Cardiology                                                                  [facility] [address] [address] [facility]                                                            Telephone: [phone]. Fax:[phone]                                                                                 Cardiac Cath NP Note      Narrative:  This is an 85 y/o F, former smoker, with a PMHx of aortic aneurysm presenting today for [facility] with Dr. Chua after having a syncopal episode. [place] and had not eaten or drank anything  for about 6 or 7 hours; she denies any SOB, palpitations, lightheadedness, nausea, chest pain precipitating her syncopal episode nor has she ever had one before.      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  No aortic rupture or dissection on imaging.  EKG: NSR, HR 95, TWI in V4 to V6 not seen on previous EKG.    ASA: 2  Mallampati: 2  Bleeding Risk: 5.4%  Creatinine: 0.33  GFR: 102      MEDICATIONS:  enoxaparin Injectable 30 milliGRAM(s) SubCutaneous daily  sodium chloride 0.9%. 1000 milliLiter(s) IV Continuous <Continuous>    PHYSICAL EXAM:  T(C): 36.7 (05-17-19 @ 12:37), Max: 36.8 (05-16-19 @ 22:26)  HR: 97 (05-17-19 @ 12:37) (91 - 105)  BP: 137/98 (05-17-19 @ 12:37) (106/75 - 145/95)  RR: 20 (05-17-19 @ 12:37) (18 - 40)  SpO2: 95% (05-17-19 @ 12:37) (94% - 96%)  Daily Height in cm: 157.48 (17 May 2019 12:37)        Constitutional: A & O x 3, cachetic   HEENT:   Normal oral mucosa, PERRL, EOMI	  Cardiovascular: Normal S1 S2, No JVD, No murmurs, No edema  Respiratory: Lungs clear to auscultation	  Gastrointestinal:  Soft, Non-tender, + BS	  Skin: No rashes, No ecchymoses, No cyanosis  Neurologic: Non-focal  Extremities: Normal range of motion, No clubbing, cyanosis or edema  Vascular: Peripheral pulses palpable 2+ bilaterally  	    LABS:	 	               11.5   9.0   )-----------( 250      ( 16 May 2019 22:58 )             35.8     05-17    134<L>  |  96<L>  |  10.0  ----------------------------<  91  3.4<L>   |  26.0  |  0.33<L>    Ca    8.1<L>      17 May 2019 07:06  Mg     1.8     05-17    TPro  6.8  /  Alb  3.3  /  TBili  0.4  /  DBili  x   /  AST  43<H>  /  ALT  24  /  AlkPhos  75  05-16    HgA1c: Hemoglobin A1C, Whole Blood: 5.5 % (05-17 @ 08:22)  TSH: Thyroid Stimulating Hormone, Serum: 4.66 uIU/mL (05-16 @ 22:58)      ASSESSMENT: 85 y/o white F, current smoker, presents today for [facility] with Dr. Chua post syncopal episodes.   -consent to be obtained  -procedure discussed with patient; risks and benefits explained, questions answered  -labs and ECG reviewed, card. note reviewed  -K+ 40mEq PO  -asa 81mg PO

## 2019-05-17 NOTE — H&P ADULT - ASSESSMENT
84yoF hx thoracic aortic aneurysm, active smoker presenting with syncopal episode     #Syncope- possibly due to orthostasis vs. vasovagal episode.  Given age, will admit to telemetry to observe for arrythmia.  TTE ordered.      #Abnormal EKG- Admission EKG shows new TWI in V4 to V6 not previously seen on prior EKG.  No denies any chest pain or dyspnea.  Cardiac enzyme negative x1, repeat cardiac enzymes and EKG ordered.    #Aortic aneurysm- stable in size.  Outpatient monitoring.     #Active smoker- counseling provided.  Nicotine patch ordered.     #Prophylactic measure- Lovenox. 84yoF hx thoracic aortic aneurysm, active smoker presenting with syncopal episode     #Syncope- possibly due to orthostasis vs. vasovagal episode, however new ischemic EKG noted in V4 to V6.  Received 1L in ED, gentle IVF ordered.  Check orthostatics.  Given age, will admit to telemetry to observe for arrythmia.  TTE ordered to assess for segmental wall abnormality.    #Abnormal EKG- Admission EKG shows new TWI in V4 to V6 not previously seen on prior EKG.  No denies any chest pain or dyspnea.  Cardiac enzyme negative x1, repeat cardiac enzymes and EKG ordered.  TTE as above.      #Abnormal UA- asymptomatic bacteruria, monitor off abx. Urine cx sent by ED.    #Aortic aneurysm- stable in size.  Outpatient monitoring.     #Active smoker- counseling provided.  Nicotine patch ordered.     #Prophylactic measure- Lovenox. 84yoF hx thoracic aortic aneurysm, active smoker presenting with syncopal episode found to have new TWI in V4 to V6    #Syncope- possibly due to orthostasis vs. vasovagal episode, however new ischemic EKG changes noted in V4 to V6.  Received 1L in ED, gentle IVF ordered.  Check orthostatics.  Given age and EKG changes, will admit to telemetry to observe for arrythmia.  TTE ordered to assess for segmental wall abnormality.      #Abnormal EKG- admission EKG shows new TWI in V4 to V6 not previously seen on prior EKG.  Pt denies any chest pain or dyspnea.  Cardiac enzyme negative x1, repeat cardiac enzymes and EKG ordered for AM.  TTE as above.  Cardiology consulted- ?need for nuclear stress testing.     #Abnormal UA- asymptomatic bacteruria, monitor off abx. Urine cx sent by ED.    #Aortic aneurysm- stable in size.  Outpatient monitoring.     #Active smoker- counseling provided.  Nicotine patch ordered.     #Prophylactic measure- Lovenox.

## 2019-05-17 NOTE — CONSULT NOTE ADULT - SUBJECTIVE AND OBJECTIVE BOX
History of Present Illness:  HPI:  84yoF hx thoracic aortic aneurysm, active smoker presenting with syncope.  Collateral hx obtained from daughter Tatiana at bedside ([phone]).  Pt was at [facility] with her family earlier today, was sitting at a table when her head dropped down and she stopped responding to them. Her family noticed that her 'eyes looked fixed'.  There were 2 nurses nearby who then placed pt on floor and began CPR without checking for a pulse and after about 5-10 seconds, pt woke up and was talking.  Pt reports that prior to the episode, she felt her 'knees buckling' a few times while walking to the today.  Pt notes that 'she didn't eat anything all day'.  She denies any lightheadedness, chest pain, dyspnea, abdominal pain, nausea, vomiting prior to the event.  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  No aortic rupture or dissection on imaging.  EKG: NSR, HR 95, TWI in V4 to V6 not seen on previous EKG. (17 May 2019 03:22)      Current Subjective Assessment: Patient with no current dizziness, CP, SOB.    Past Medical History  Diverticulosis  Hiatal hernia  Cholelithiasis  Syncope and collapse  History of aortic aneurysm  Neuropathy      Past Surgical History  Bilateral cataracts  No significant past surgical history    TAKES NO MEDICATIONS AT HOME    MEDICATIONS  (STANDING):  enoxaparin Injectable 30 milliGRAM(s) SubCutaneous daily  losartan 25 milliGRAM(s) Oral daily  metoprolol succinate ER 50 milliGRAM(s) Oral daily  nicotine -   7 mG/24Hr(s) Patch 1 patch Transdermal daily  sodium chloride 0.9%. 1000 milliLiter(s) (75 mL/Hr) IV Continuous <Continuous>    MEDICATIONS  (PRN):  Allergies: No Known Allergies      SOCIAL HISTORY:  Smoker: [x ] Yes  [ ] No        PACK YEARS:  15    (3 cigarettes a day)                   WHEN QUIT?   ETOH use: [ ] Yes  [ x] No              FREQUENCY / QUANTITY:  Ilicit Drug use:  [ ] Yes  [x ] No  Occupation: retired   Live with: alone  Assist device use: none    PMD: Ghassan Snowden MD   Referring Cardiologist: none (Earnestine/Harshil)    Relevant Family History  FAMILY HISTORY:  No pertinent family history in first degree relatives  (Father Parkinsons, Mother, old age)      Review of Systems  ROS:  HEENT: Denies headache, blurry vision  RESPIRATORY: Denies cough  CARDIAC: Denies chest pain, racing heart  GASTROINTESTINAL: Denies, constipation, diarrhea  GENITOURINARY: Denies dysuria, hematuria  NEUROLOGIC: Denies headaches, dizziness  MUSCULOSKELETAL: Denies muscle weakness  VASCULAR: Denies claudication and cramping.  ENDOCRINOLOGY: Denies heat or cold intolerance  HEMATOLOGY: Denies easy bleeding or blood transfusion.  DERMATOLOGY: Denies changes in moles or pigmentation changes  PSYCHIATRY: Denies depression, agitation or anxiety    INTERVAL HPI/OVERNIGHT EVENTS:    SUBJECTIVE:      MEDICATIONS  (STANDING):  enoxaparin Injectable 30 milliGRAM(s) SubCutaneous daily  losartan 25 milliGRAM(s) Oral daily  metoprolol succinate ER 50 milliGRAM(s) Oral daily  nicotine -   7 mG/24Hr(s) Patch 1 patch Transdermal daily  sodium chloride 0.9%. 1000 milliLiter(s) (75 mL/Hr) IV Continuous <Continuous>    MEDICATIONS  (PRN):      Vital Signs Last 24 Hrs  T(C): 36.9 (17 May 2019 19:52), Max: 36.9 (17 May 2019 19:52)  T(F): 98.4 (17 May 2019 19:52), Max: 98.4 (17 May 2019 19:52)  HR: 107 (17 May 2019 19:52) (58 - 107)  BP: 145/99 (17 May 2019 19:52) (106/75 - 153/75)  BP(mean): 106 (17 May 2019 08:00) (106 - 106)  RR: 16 (17 May 2019 19:52) (15 - 40)  SpO2: 92% (17 May 2019 19:52) (92% - 98%)    PE  Gen: NAD AAO3, pleasant. Poor dentition  Pulm: CTAB  CV: RRR  Abd: Soft NTND  Ext: WWP  Vasc:  L Radial 2+ Palpable  R Radial 2+ Palpable  L Femoral 2+ Palpable  R Femoral 2+ Palpable  L Popliteal 2+ Palpable; No aneurysm palpated  R Popliteal 2+ Palpable; No aneurysm palpated  L DP and PT 2+ palpable  R DP and PT 2+ palpable  Neuro: CN II-XII intact, nonfocal      I&O's Detail      LABS:                        11.5   9.0   )-----------( 250      ( 16 May 2019 22:58 )             35.8         134<L>  |  96<L>  |  10.0  ----------------------------<  91  3.4<L>   |  26.0  |  0.33<L>    Ca    8.1<L>      17 May 2019 07:06  Mg     1.8         TPro  6.8  /  Alb  3.3  /  TBili  0.4  /  DBili  x   /  AST  43<H>  /  ALT  24  /  AlkPhos  75      PT/INR - ( 16 May 2019 22:58 )   PT: 12.3 sec;   INR: 1.07 ratio         PTT - ( 16 May 2019 22:58 )  PTT:23.9 sec  Urinalysis Basic - ( 17 May 2019 03:05 )    Color: Yellow / Appearance: Clear / S.010 / pH: x  Gluc: x / Ketone: Negative  / Bili: Negative / Urobili: 4 mg/dL   Blood: x / Protein: 30 mg/dL / Nitrite: Negative   Leuk Esterase: Small / RBC: 3-5 /HPF / WBC 6-10   Sq Epi: x / Non Sq Epi: Few / Bacteria: Occasional        RADIOLOGY & ADDITIONAL STUDIES:           < from: CT Angio Abdomen and Pelvis w/ IV Cont (19 @ 00:31) >    No significant interval change compared to the recent CT of 2018.  No aortic dissection or aortic rupture. Redemonstration of 4.3 cm   aneurysmal dilatation of the ascending aorta and aortic arch. 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  No central pulmonary embolism.  Persistent 1 cm linear opacity in the left upper lobe in the setting of   emphysema. Recommend follow-up in 3 months.  Mild cardiomegaly.  Moderate hiatal hernia.  Moderate diverticulosis.  Gallstones.    < end of copied text >      < from: CT Head No Cont (19 @ 00:30) >    IMPRESSION:    No intracranial hemorrhage, mass effect or large acute cortical infarct.    < end of copied text >    < from: TTE Echo Complete w/Doppler (05.17.19 @ 10:20) >    Summary:   1. Left ventricular ejection fraction, by visual estimation, is 25 to   30%.   2. Severely decreased global left ventricular systolic function.   3. Spectral Doppler shows impaired relaxation pattern of left   ventricular myocardial filling (Grade I diastolic dysfunction).   4. Normal right ventricular size and function.   5. Moderately enlarged leftatrium.   6. Trace mitral valve regurgitation.   7. Mild-moderate tricuspid regurgitation.   8. Dilatation of the ascending aorta.   9. Estimated pulmonary artery systolic pressure is 40.7 mmHg assuming a   right atrial pressure of 3 mmHg, which is consistent with mild pulmonary   hypertension.  10. There is no evidence of pericardial effusion.  11. Endocardial visualization was enhanced with intravenous echo contrast.    < end of copied text >      Cardiac Cath: < from: Cardiac Cath Lab - Adult (19 @ 15:05) >  VENTRICLES: Analysis of regional contractile function demonstrated severe  anterolateral hypokinesis and diaphragmatic akinesis. Global left  ventricular function was moderately depressed. EF estimated was 35 %.  VALVES: AORTIC VALVE: There was mild aortic insufficiency. No significant  aortic valve gradient. MITRAL VALVE: The mitral valve exhibited trivial  regurgitation (less than 1+).  CORONARY VESSELS: The coronary circulation is right dominant.  LM:   --  LM: Normal. The vessel was normal sized, not calcified, and not  tortuous. Angiography showed no evidence of disease.  LAD:   --  LAD: Normal. The vessel was normal sized, not calcified, and not  tortuous. Angiography showed minor luminal irregularities with no flow  limiting lesions.  CX:   --  Circumflex: Normal. The vessel was small sized (non-dominant),  not calcified, and moderately tortuous. Angiography showed minor luminal  irregularities with no flow limiting lesions.  RCA:   --  RCA: Normal. The vessel was normal sized, moderately calcified,  and excessively tortuous. Angiography showed moderate atherosclerosis.  There was a tubular 60 % stenosis in the middle third of the vessel  segment. The lesion was without evidence of thrombus. There was FRANKIE grade  3 flow through the vessel (brisk flow).  AORTA: The root exhibited dilatation.  COMPLICATIONS: There were no complications. No complications occurred  during the cath lab visit.  DIAGNOSTIC IMPRESSIONS: There is significant single vessel coronary artery  disease.  Mid RCA=60% Left ventricular function is abnormal.  LVEF=30-35%  Dilated Ascending Aorta  Minimal Aortic Regurgitation  DIAGNOSTIC RECOMMENDATIONS: The patient should continue with the present  medications. Patient management should include aggressive medical therapy,  close monitoring of BUN and creatinine, resumption of all previous  activities in 2 days, and smoking cessation. Medical management is  recommended.  Prepared and signed by  Yair Chua MD  Signed 2019 16:04:41    < end of copied text >

## 2019-05-17 NOTE — CONSULT NOTE ADULT - PROBLEM SELECTOR RECOMMENDATION 9
with arch aneurysm and descending aneurysm with intramural thrombus.  ~ Will attempt to get echo done asap  ~ Carotid Ultrasound ordered  ~ BP monitoring and control if necessary  Will discuss with Dr Stone with arch aneurysm and descending aneurysm with intramural thrombus.  ~ Outpatient cardiac surgery followup  ~ Heart failure management  ~ Carotid Ultrasound ordered  ~ BP monitoring and control if necessary  Will discuss with Dr Stone

## 2019-05-17 NOTE — PROGRESS NOTE ADULT - ASSESSMENT
84yoF hx thoracic aortic aneurysm, active smoker presenting with syncopal episode found to have new TWI in V4 to V6 and drop in EF     #Syncope- possibly due to arrythmia vs embolic episode. CTA chest - neg for PE  s/p LHC - non-obstructive CAD, IVF ordered.  Check orthostatics.  Given age and EKG changes, will admit to telemetry to observe for arrythmia.       #Aortic aneurysm with thrombosis in descending aorta - await CTSx recs, may need JACKSON to r/o emboli     #Abnormal UA- asymptomatic bacteruria, monitor off abx. Urine cx sent by ED.    #Active smoker- counseling provided.  Nicotine patch ordered.     #Prophylactic measure- Lovenox.   # Cachexia - BMI 15 - diet eval - Imaging - no significant abnormalities. will add HIV, unable to give details of How long has she lost weight.

## 2019-05-18 LAB
ANION GAP SERPL CALC-SCNC: 12 MMOL/L — SIGNIFICANT CHANGE UP (ref 5–17)
BUN SERPL-MCNC: 12 MG/DL — SIGNIFICANT CHANGE UP (ref 8–20)
CALCIUM SERPL-MCNC: 8.4 MG/DL — LOW (ref 8.6–10.2)
CHLORIDE SERPL-SCNC: 94 MMOL/L — LOW (ref 98–107)
CO2 SERPL-SCNC: 25 MMOL/L — SIGNIFICANT CHANGE UP (ref 22–29)
CREAT SERPL-MCNC: 0.62 MG/DL — SIGNIFICANT CHANGE UP (ref 0.5–1.3)
CULTURE RESULTS: SIGNIFICANT CHANGE UP
GLUCOSE SERPL-MCNC: 116 MG/DL — HIGH (ref 70–115)
HCT VFR BLD CALC: 35.9 % — LOW (ref 37–47)
HGB BLD-MCNC: 11.5 G/DL — LOW (ref 12–16)
MAGNESIUM SERPL-MCNC: 1.8 MG/DL — SIGNIFICANT CHANGE UP (ref 1.8–2.6)
MCHC RBC-ENTMCNC: 27.9 PG — SIGNIFICANT CHANGE UP (ref 27–31)
MCHC RBC-ENTMCNC: 32 G/DL — SIGNIFICANT CHANGE UP (ref 32–36)
MCV RBC AUTO: 87.1 FL — SIGNIFICANT CHANGE UP (ref 81–99)
PHOSPHATE SERPL-MCNC: 3 MG/DL — SIGNIFICANT CHANGE UP (ref 2.4–4.7)
PLATELET # BLD AUTO: 313 K/UL — SIGNIFICANT CHANGE UP (ref 150–400)
POTASSIUM SERPL-MCNC: 4.4 MMOL/L — SIGNIFICANT CHANGE UP (ref 3.5–5.3)
POTASSIUM SERPL-SCNC: 4.4 MMOL/L — SIGNIFICANT CHANGE UP (ref 3.5–5.3)
RBC # BLD: 4.12 M/UL — LOW (ref 4.4–5.2)
RBC # FLD: 15.4 % — SIGNIFICANT CHANGE UP (ref 11–15.6)
SODIUM SERPL-SCNC: 131 MMOL/L — LOW (ref 135–145)
SPECIMEN SOURCE: SIGNIFICANT CHANGE UP
WBC # BLD: 7.7 K/UL — SIGNIFICANT CHANGE UP (ref 4.8–10.8)
WBC # FLD AUTO: 7.7 K/UL — SIGNIFICANT CHANGE UP (ref 4.8–10.8)

## 2019-05-18 PROCEDURE — 80053 COMPREHEN METABOLIC PANEL: CPT

## 2019-05-18 PROCEDURE — 93567 NJX CAR CTH SPRVLV AORTGRPHY: CPT

## 2019-05-18 PROCEDURE — 85610 PROTHROMBIN TIME: CPT

## 2019-05-18 PROCEDURE — 82550 ASSAY OF CK (CPK): CPT

## 2019-05-18 PROCEDURE — 85730 THROMBOPLASTIN TIME PARTIAL: CPT

## 2019-05-18 PROCEDURE — 93880 EXTRACRANIAL BILAT STUDY: CPT | Mod: 26

## 2019-05-18 PROCEDURE — 96361 HYDRATE IV INFUSION ADD-ON: CPT

## 2019-05-18 PROCEDURE — 96374 THER/PROPH/DIAG INJ IV PUSH: CPT

## 2019-05-18 PROCEDURE — 99239 HOSP IP/OBS DSCHRG MGMT >30: CPT

## 2019-05-18 PROCEDURE — 93005 ELECTROCARDIOGRAM TRACING: CPT

## 2019-05-18 PROCEDURE — 93880 EXTRACRANIAL BILAT STUDY: CPT

## 2019-05-18 PROCEDURE — 83036 HEMOGLOBIN GLYCOSYLATED A1C: CPT

## 2019-05-18 PROCEDURE — C1887: CPT

## 2019-05-18 PROCEDURE — 96375 TX/PRO/DX INJ NEW DRUG ADDON: CPT

## 2019-05-18 PROCEDURE — 83690 ASSAY OF LIPASE: CPT

## 2019-05-18 PROCEDURE — 93306 TTE W/DOPPLER COMPLETE: CPT

## 2019-05-18 PROCEDURE — 36415 COLL VENOUS BLD VENIPUNCTURE: CPT

## 2019-05-18 PROCEDURE — 81001 URINALYSIS AUTO W/SCOPE: CPT

## 2019-05-18 PROCEDURE — 71046 X-RAY EXAM CHEST 2 VIEWS: CPT

## 2019-05-18 PROCEDURE — 84443 ASSAY THYROID STIM HORMONE: CPT

## 2019-05-18 PROCEDURE — 80048 BASIC METABOLIC PNL TOTAL CA: CPT

## 2019-05-18 PROCEDURE — 99285 EMERGENCY DEPT VISIT HI MDM: CPT

## 2019-05-18 PROCEDURE — 80061 LIPID PANEL: CPT

## 2019-05-18 PROCEDURE — 84100 ASSAY OF PHOSPHORUS: CPT

## 2019-05-18 PROCEDURE — C1769: CPT

## 2019-05-18 PROCEDURE — 85027 COMPLETE CBC AUTOMATED: CPT

## 2019-05-18 PROCEDURE — 83735 ASSAY OF MAGNESIUM: CPT

## 2019-05-18 PROCEDURE — 70450 CT HEAD/BRAIN W/O DYE: CPT

## 2019-05-18 PROCEDURE — 84484 ASSAY OF TROPONIN QUANT: CPT

## 2019-05-18 PROCEDURE — 93458 L HRT ARTERY/VENTRICLE ANGIO: CPT

## 2019-05-18 PROCEDURE — 74174 CTA ABD&PLVS W/CONTRAST: CPT

## 2019-05-18 PROCEDURE — 87086 URINE CULTURE/COLONY COUNT: CPT

## 2019-05-18 PROCEDURE — 71275 CT ANGIOGRAPHY CHEST: CPT

## 2019-05-18 RX ORDER — METOPROLOL TARTRATE 50 MG
1 TABLET ORAL
Qty: 30 | Refills: 0
Start: 2019-05-18 | End: 2019-06-16

## 2019-05-18 RX ORDER — SODIUM CHLORIDE 9 MG/ML
1 INJECTION INTRAMUSCULAR; INTRAVENOUS; SUBCUTANEOUS
Qty: 6 | Refills: 0
Start: 2019-05-18 | End: 2019-05-20

## 2019-05-18 RX ORDER — LOSARTAN POTASSIUM 100 MG/1
1 TABLET, FILM COATED ORAL
Qty: 30 | Refills: 0
Start: 2019-05-18 | End: 2019-06-16

## 2019-05-18 RX ORDER — ATORVASTATIN CALCIUM 80 MG/1
1 TABLET, FILM COATED ORAL
Qty: 30 | Refills: 0
Start: 2019-05-18 | End: 2019-06-16

## 2019-05-18 RX ADMIN — LOSARTAN POTASSIUM 25 MILLIGRAM(S): 100 TABLET, FILM COATED ORAL at 05:39

## 2019-05-18 RX ADMIN — Medication 50 MILLIGRAM(S): at 05:39

## 2019-05-18 NOTE — DISCHARGE NOTE PROVIDER - NSDCCPCAREPLAN_GEN_ALL_CORE_FT
PRINCIPAL DISCHARGE DIAGNOSIS  Diagnosis: Syncope  Assessment and Plan of Treatment: your symptoms have resolved  please take your medications as prescribed and follow up with cardiology, CT surgery and vascular surgery as outpatient within 1 week      SECONDARY DISCHARGE DIAGNOSES  Diagnosis: Hyponatremia  Assessment and Plan of Treatment: please take salt tablets twice a day and follow up with your PCP on Monday to repeat BMP and ensure your sodium levels have improved  please adhere to fluid restriction (no more than 1.5 liters of water daily unless further advised by your PCP)    Diagnosis: Ascending aortic aneurysm  Assessment and Plan of Treatment: please follow up with vascular surgery as outpatient within 1 week

## 2019-05-18 NOTE — CHART NOTE - NSCHARTNOTEFT_GEN_A_CORE
Patient asked to fill out molst form  she wants to be full dnr  No intubation  No feeding tube  trial of iv antibiotics and ivf  Rn notified  order written

## 2019-05-18 NOTE — PROGRESS NOTE ADULT - SUBJECTIVE AND OBJECTIVE BOX
Nurse Practitioner Progress note:     84y  Female is now s/p left heart catheterization via left radial approach (no site complications) with Dr. Chua which showed:  mRCA 60%   EF 30%    Patient is frail and cachetic   S1S2  Lungs diminished at the bases and with rhonchi  Right radial hemoband in place.  Procedure site CDI, no bleeding, no hematoma, able to move all digits with capillary refill < 3 seconds, fingers warm      T(C): 36.5 (05-18-19 @ 05:38), Max: 36.9 (05-17-19 @ 19:52)  HR: 100 (05-18-19 @ 05:38) (58 - 107)  BP: 148/90 (05-18-19 @ 05:38) (115/79 - 153/75)  RR: 16 (05-18-19 @ 05:38) (15 - 20)  SpO2: 93% (05-18-19 @ 05:38) (92% - 98%)    CBC Full  -  ( 16 May 2019 22:58 )  WBC Count : 9.0 K/uL  RBC Count : 4.05 M/uL  Hemoglobin : 11.5 g/dL  Hematocrit : 35.8 %  Platelet Count - Automated : 250 K/uL  Mean Cell Volume : 88.4 fl  Mean Cell Hemoglobin : 28.4 pg  Mean Cell Hemoglobin Concentration : 32.1 g/dL  Auto Neutrophil # : 7.9 K/uL  Auto Lymphocyte # : 0.6 K/uL  Auto Monocyte # : 0.4 K/uL  Auto Eosinophil # : 0.0 K/uL  Auto Basophil # : 0.0 K/uL  Auto Neutrophil % : 87.7 %  Auto Lymphocyte % : 6.6 %  Auto Monocyte % : 4.9 %  Auto Eosinophil % : 0.1 %  Auto Basophil % : 0.1 %    05-17    134<L>  |  96<L>  |  10.0  ----------------------------<  91  3.4<L>   |  26.0  |  0.33<L>    Ca    8.1<L>      17 May 2019 07:06  Mg     1.8     05-17    TPro  6.8  /  Alb  3.3  /  TBili  0.4  /  DBili  x   /  AST  43<H>  /  ALT  24  /  AlkPhos  75  05-16    CARDIAC MARKERS ( 17 May 2019 07:06 )  x     / <0.01 ng/mL / 37 U/L / x     / x      CARDIAC MARKERS ( 16 May 2019 22:58 )  x     / <0.01 ng/mL / x     / x     / x              MEDICATIONS  (STANDING):  enoxaparin Injectable 30 milliGRAM(s) SubCutaneous daily  losartan 25 milliGRAM(s) Oral daily  metoprolol succinate ER 50 milliGRAM(s) Oral daily  nicotine -   7 mG/24Hr(s) Patch 1 patch Transdermal daily  sodium chloride 0.9%. 1000 milliLiter(s) (75 mL/Hr) IV Continuous <Continuous>        HPI:  84yoF hx thoracic aortic aneurysm, active smoker presenting with syncope.  Collateral hx obtained from daughter Tatiana at bedside ([phone]).  Pt was at [facility] with her family earlier today, was sitting at a table when her head dropped down and she stopped responding to them. Her family noticed that her 'eyes looked fixed'.  There were 2 nurses nearby who then placed pt on floor and began CPR without checking for a pulse and after about 5-10 seconds, pt woke up and was talking.  Pt reports that prior to the episode, she felt her 'knees buckling' a few times while walking to the today.  Pt notes that 'she didn't eat anything all day'.  She denies any lightheadedness, chest pain, dyspnea, abdominal pain, nausea, vomiting prior to the event.  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  No aortic rupture or dissection on imaging.  EKG: NSR, HR 95, TWI in V4 to V6 not seen on previous EKG. (17 May 2019 03:22)    now s/p C     ASSESSMENT/PLAN:    Coronary artery disease  Patient being followed by vascular and CTS  Cont POC as per primary team Thank you

## 2019-05-18 NOTE — DISCHARGE NOTE PROVIDER - HOSPITAL COURSE
Patient is a 84 year old female with PMH of thoracic aortic aneurysm, active smoker who presented to the ED with syncopal episode found to have new TWI in V4 to V6.        #Syncope- possibly due to orthostasis vs. vasovagal episode, however new ischemic EKG changes noted in V4 to V6.  Received 1L in ED, gentle IVF ordered.  Orthostatics negative. TTE with EF 30-35%. Started on metoprolol and lisinopril. Cardiac cath negative for obstructive disease. Tele with APCs, discussed with cardiology team and patient is cleared for discharge. Carotid duplex with right ICA stenosis 50-69%; discussed with vascular team who recommended statin but no ASA or plavix. Vascular recommending outpatient follow up.        #Abnormal UA- asymptomatic bacteruria, monitor off abx. Urine cx < 10,000 GPC. No indication for treatment.         #Aortic aneurysm- stable in size.  Vascular surgery and CT surgery consult appreciated; outpatient follow up.          #Active smoker- counseling provided.  Nicotine patch ordered.        #Mild Hyponatremia - patient is euvolemic; orthostatics negative. Drop in sodium with administration of NS. Patient asymptomatic, therefore will discharge home on salt tabs and repeat BMP as outpatient on Monday.         Medically stable for discharge home. Discharge discussed with cardiology, CT surgery and vascular teams who have cleared patient for discharge with outpatient follow up. Vascular recommending to start statin, no indication for anti-platelet per PA. Time spent on discharge 45 minutes.

## 2019-05-18 NOTE — DISCHARGE NOTE PROVIDER - CARE PROVIDERS DIRECT ADDRESSES
[email or web address]

## 2019-05-18 NOTE — DISCHARGE NOTE NURSING/CASE MANAGEMENT/SOCIAL WORK - NSDCPEEMAIL_GEN_ALL_CORE
[facility] for Tobacco Control email [email or web address]

## 2019-05-18 NOTE — DISCHARGE NOTE PROVIDER - CARE PROVIDER_API CALL
Abram Colby)  Cardiovascular Disease  [address], [address]  [place] [address]  Phone: [phone]  Fax: [phone]  Follow Up Time:     Yair Nguyen (MD)  Surgery; Vascular Surgery  [address] [place] [address]  Phone: [phone]  Fax: [phone]  Follow Up Time:     Randal Roca)  Surgery; Thoracic Surgery  [address] [place] [address]  Phone: [phone]  Fax: [phone]  Follow Up Time:

## 2019-05-18 NOTE — DISCHARGE NOTE NURSING/CASE MANAGEMENT/SOCIAL WORK - NSDCPEWEB_GEN_ALL_CORE
[facility] for Tobacco Control website --- [email or web address] website --- [email or web address]

## 2019-05-19 VITALS — WEIGHT: 195.33 LBS

## 2019-06-07 NOTE — PATIENT PROFILE ADULT - NSPROPOAPRESSUREINJURY_GEN_A_NUR
Maximiliano Orr (MD)  Orthopaedic Surgery  [address], [address]  [place] [address]  Phone: [phone]  Fax: [phone]  Follow Up Time: no

## 2019-10-09 ENCOUNTER — EMERGENCY (EMERGENCY)
Facility: HOSPITAL | Age: 84
LOS: 1 days | Discharge: HOME | End: 2019-10-09
Attending: EMERGENCY MEDICINE | Admitting: EMERGENCY MEDICINE
Payer: COMMERCIAL

## 2019-10-09 VITALS
RESPIRATION RATE: 16 BRPM | WEIGHT: 89.95 LBS | DIASTOLIC BLOOD PRESSURE: 76 MMHG | TEMPERATURE: 98 F | HEART RATE: 94 BPM | OXYGEN SATURATION: 95 % | HEIGHT: 62 IN | SYSTOLIC BLOOD PRESSURE: 117 MMHG

## 2019-10-09 DIAGNOSIS — H26.9 UNSPECIFIED CATARACT: Chronic | ICD-10-CM

## 2019-10-09 PROCEDURE — 99283 EMERGENCY DEPT VISIT LOW MDM: CPT

## 2019-10-09 PROCEDURE — 99283 EMERGENCY DEPT VISIT LOW MDM: CPT | Mod: 25

## 2019-10-09 PROCEDURE — 72110 X-RAY EXAM L-2 SPINE 4/>VWS: CPT

## 2019-10-09 PROCEDURE — 72110 X-RAY EXAM L-2 SPINE 4/>VWS: CPT | Mod: 26

## 2019-10-09 RX ORDER — CYCLOBENZAPRINE HYDROCHLORIDE 10 MG/1
10 TABLET, FILM COATED ORAL ONCE
Refills: 0 | Status: COMPLETED | OUTPATIENT
Start: 2019-10-09 | End: 2019-10-09

## 2019-10-09 RX ORDER — PREGABALIN 225 MG/1
1 CAPSULE ORAL
Qty: 0 | Refills: 0 | DISCHARGE

## 2019-10-09 RX ORDER — TRAMADOL HYDROCHLORIDE 50 MG/1
1 TABLET ORAL
Qty: 0 | Refills: 0 | DISCHARGE

## 2019-10-09 RX ORDER — OXYCODONE AND ACETAMINOPHEN 5; 325 MG/1; MG/1
1 TABLET ORAL ONCE
Refills: 0 | Status: DISCONTINUED | OUTPATIENT
Start: 2019-10-09 | End: 2019-10-09

## 2019-10-09 RX ORDER — OMEGA-3 ACID ETHYL ESTERS 1 G
2 CAPSULE ORAL
Qty: 0 | Refills: 0 | DISCHARGE

## 2019-10-09 RX ADMIN — CYCLOBENZAPRINE HYDROCHLORIDE 10 MILLIGRAM(S): 10 TABLET, FILM COATED ORAL at 23:13

## 2019-10-09 RX ADMIN — OXYCODONE AND ACETAMINOPHEN 1 TABLET(S): 5; 325 TABLET ORAL at 22:11

## 2019-10-09 RX ADMIN — OXYCODONE AND ACETAMINOPHEN 1 TABLET(S): 5; 325 TABLET ORAL at 23:02

## 2019-10-09 NOTE — ED PROVIDER NOTE - NSFOLLOWUPINSTRUCTIONS_ED_ALL_ED_FT
1) Follow-up with your Primary Medical Doctor or referred doctor. Call today / next business day for prompt follow-up.  2) Return to Emergency room for any worsening or persistent pain, weakness, fever, or any other concerning symptoms.  3) See attached instruction sheets for additional information, including information regarding signs and symptoms to look out for, reasons to seek immediate care and other important instructions.  4) Take percocet 5mg every 6 hours as needed for pain.  5) Flexeril 10mg every 8 hours as needed.  Do not operate any machinery while on flexeril and/or percocet

## 2019-10-09 NOTE — ED PROVIDER NOTE - ATTENDING CONTRIBUTION TO CARE
85 yo female c/o lower back pain x 3 weeks, taking tramadol that helped her, but now not working anymore.  8/10.  Worse with movement.  No recent fall/trauma.  No fever/chills.  No urinary/bowel changes.    Gen: Alert, NAD, thin appearing  Head/eyes: NC/AT, PERRL, EOMI  ENT: airway patent  Neck: supple, no tenderness/meningismus/JVD, Trachea midline  Pulm/lung: Bilateral clear BS, normal resp effort, no wheeze/stridor/retractions  CV/heart: RRR, no M/R/G, +2 dist pulses (radial, pedal DP/PT, popliteal)  GI/Abd: soft, NT/ND, +BS, no guarding/rebound tenderness  Musculoskeletal: no edema/erythema/cyanosis, FROM in all extremities, no C/T/L spine ttp, +ttp left paraspinal lumbar and left buttock  Skin: no rash, no vesicles, no petechaie, no ecchymosis, no swelling  Neuro: AAOx3, CN 2-12 intact, normal sensation, 5/5 motor strength in all extremities    Xray negative for lumbar fx, incidental finding of calcified gallstones, improved with PO medications, f/u with outpatient ortho

## 2019-10-09 NOTE — ED PROVIDER NOTE - CHPI ED SYMPTOMS NEG
no motor function loss/no bladder dysfunction/no constipation/no bowel dysfunction/no numbness/no tingling/no neck tenderness

## 2019-10-09 NOTE — ED PROVIDER NOTE - PATIENT PORTAL LINK FT
You can access the FollowMyHealth Patient Portal offered by [facility] by registering at the following website: [email or web address]. By joining [employer] [facility]’s FollowMyHealth portal, you will also be able to view your health information using other applications (apps) compatible with our system.

## 2019-10-09 NOTE — ED PROVIDER NOTE - CARE PROVIDER_API CALL
Shaquille Taylor)  Orthopaedic Surgery  [address] [place] [address]  Phone: [phone]  Fax: [phone]  Follow Up Time:

## 2019-10-09 NOTE — ED PROVIDER NOTE - SKIN, MLM
Patient:   ZOE JUAREZ            MRN: [id number]            FIN: [id number]               Age:   35 years     Sex:  FEMALE     :  [birth year]   Associated Diagnoses:   None   Author:   RADHA, TIO DONNELLY      Operative Note  [phone]  [facility]      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 She is BRCA positive and is now status post bilateral total mastectomy. She is to begin systemic chemotherapy on Friday.  She is admitted today for insertion of a portacatheter.    Findings: N/A     Description of Procedure:  I met the Zoe and her mother in the presurgical holding area where we have chosen and marked the surgical site.  She is given 2 g of Ancef and brought to the operating room where she is placed in a supine position and General anesthesia by LMA is obtained.  The left anterior shoulder is prepped and draped in the usual aseptic fashion.  Local infiltration anesthesia is obtained with an equal parts mixture 1% plain lidocaine and 0.5% plain Marcaine.  The left subclavian vein is percutaneously accessed on a single direct stick with an 18-gauge needle through which a soft, flexible J-tip guidewire is passed.  This is confirmed to be in the right heart under fluoroscopy.  An oblique incision is then made from the point of skin puncture inferiorly for  approximately 3 cm.  The peel-away sheath introducer is slid over the indwelling guidewire, following which the guidewire and obturator are removed. The catheter is inserted and slid into the right atrial/superior vena cava juncture.  The  position of the catheter is confirmed by intraoperative fluoroscopy.  The  excess catheter length is resected and the catheter is mated to the port in the usual fashion.  A subcutaneous pocket was then developed medially and inferiorly into which the port is placed and secured with a 3-0 Prolene sutures.  Satisfied with hemostasis the surgical incision is closed with a series of interrupted subcuticular sutures of 5-0 Monocryl, a running 5-0 Monocryl and topical Dermabond.  The port is then percutaneously accessed with a Reyes needle and flushed with 10 mL of Hep-Lock solution.  The patient was wakened from anesthesia and brought to the recovery room in stable condition.  Final sponge, needle and instrument counts reported correct.  A chest x-ray is pending for hardcopy confirmation of catheter tip location.      Heriberto Cruz MD, FACS  cell [phone]            Electronically Signed On 10/04/2017 12:07  __________________________________________________   RADHA, HERIBERTO DONNELLY     Skin normal color for race, warm, dry and intact. No evidence of rash.

## 2019-10-09 NOTE — ED PROVIDER NOTE - OBJECTIVE STATEMENT
85 y/o female presents to ED c/o lower back pain x 3 weeks. States the tramadol is not working for her pain. States she has known old compression fracture that she was diagnosed with 6 months ago but has not followed up with a spine dr. Rates pain 8/10, worse with movement, no radiation of pain, gradual onset. Denies recent fall or trauma. Denies urinary or bowel incontinence. Denies saddle paresthesia. Denies any other complaints. States otherwise feels good. Denies n/v, f/c, chest pain, sob, numbness, tingling, headache, lightheadedness, dizziness, visual changes.

## 2019-10-09 NOTE — ED ADULT NURSE NOTE - CHPI ED NUR SYMPTOMS NEG
no fatigue/no motor function loss/no constipation/no bowel dysfunction/no bladder dysfunction/no neck tenderness/no numbness/no tingling

## 2019-10-09 NOTE — ED PROVIDER NOTE - PHYSICAL EXAMINATION
Head- NC/AT. No julio signs, no raccoon eyes, no hemotympanum, no contusions, no hematoma, no dental injuries.  Spine- no midline or paraspinal tenderness of cspine or thoracic spine. +midline and BL paraspinal tenderness of lumbar spine. No signs of back trauma, no masses, no abrasions, no lacerations, no redness, no bruising.  Neck- supple, no midline tenderness to palpation, + FROM, NEXUS negative, no abrasions, no ecchymosis.  Neuro- EOM intact, no nystagmus. Pelvis stable. Pt able to straight leg raise BL. NVI, good distal pulses x 4 extremities, capillary refill <2 sec x 4 extremities, sensation intact throughout, 5/5 motor x 4 extremities. DTRs normal x 4 extremities.

## 2019-10-09 NOTE — ED ADULT NURSE NOTE - OBJECTIVE STATEMENT
Patient with history of diverticulosis Patient with history of diverticulosis presents to the ER with c/o persistent lower back pain x3 weeks.  Patient prescribed tramadol by PMD, "it was working now it doesn't".  Patient denies numbness, tingling or bladder dysfunction.  Pain is worse with movement.  Patient denies trauma or injury.  Patient denies CP, SOB, lightheadedness, dizziness, urinary symptoms.

## 2019-10-10 VITALS
OXYGEN SATURATION: 98 % | TEMPERATURE: 98 F | DIASTOLIC BLOOD PRESSURE: 85 MMHG | HEART RATE: 87 BPM | SYSTOLIC BLOOD PRESSURE: 120 MMHG | RESPIRATION RATE: 16 BRPM

## 2019-10-10 PROBLEM — K57.90 DIVERTICULOSIS OF INTESTINE, PART UNSPECIFIED, WITHOUT PERFORATION OR ABSCESS WITHOUT BLEEDING: Chronic | Status: ACTIVE | Noted: 2019-05-17

## 2019-10-10 PROBLEM — R55 SYNCOPE AND COLLAPSE: Chronic | Status: ACTIVE | Noted: 2019-05-17

## 2019-10-10 PROBLEM — K80.20 CALCULUS OF GALLBLADDER WITHOUT CHOLECYSTITIS WITHOUT OBSTRUCTION: Chronic | Status: ACTIVE | Noted: 2019-05-17

## 2019-10-10 PROBLEM — Z86.79 PERSONAL HISTORY OF OTHER DISEASES OF THE CIRCULATORY SYSTEM: Chronic | Status: ACTIVE | Noted: 2019-05-17

## 2019-10-10 PROBLEM — K44.9 DIAPHRAGMATIC HERNIA WITHOUT OBSTRUCTION OR GANGRENE: Chronic | Status: ACTIVE | Noted: 2019-05-17

## 2019-10-10 RX ORDER — OXYCODONE HYDROCHLORIDE 5 MG/1
1 TABLET ORAL
Qty: 12 | Refills: 0
Start: 2019-10-10 | End: 2019-10-12

## 2019-10-10 RX ORDER — CYCLOBENZAPRINE HYDROCHLORIDE 10 MG/1
1 TABLET, FILM COATED ORAL
Qty: 15 | Refills: 0
Start: 2019-10-10 | End: 2019-10-14

## 2020-03-27 PROBLEM — Z00.00 ENCOUNTER FOR PREVENTIVE HEALTH EXAMINATION: Status: ACTIVE | Noted: 2020-03-27

## 2022-04-05 NOTE — ED PROVIDER NOTE - TIMING
sudden onset
Render Note In Bullet Format When Appropriate: No
Duration Of Freeze Thaw-Cycle (Seconds): 0
Post-Care Instructions: I reviewed with the patient in detail post-care instructions. Patient is to wear sunprotection, and avoid picking at any of the treated lesions. Pt may apply Vaseline to crusted or scabbing areas.
Detail Level: Detailed
Show Applicator Variable?: Yes
Consent: The patient's consent was obtained including but not limited to risks of crusting, scabbing, blistering, scarring, darker or lighter pigmentary change, recurrence, incomplete removal and infection.

## 2024-04-22 NOTE — ASU PATIENT PROFILE, ADULT - NS PRO PASSIVE SMOKE EXP
After your thyroid surgery, start levothyroxine 88 mcg daily. Take levothyroxine in the morning at least 30 minutes before food, drinks besides water and other medications.     Check thyroid labs 6-8 weeks after your thyroid surgery, sometime between June 19th - July 3.   
No

## 2024-06-05 NOTE — ED ADULT NURSE NOTE - NSFALLRSKHARMRISK_ED_ALL_ED
Enriqueta Renee   is a   67   year old  female   here today for follow-up of PBC.  Her most recent alkaline phosphatase was 214 but she is still not interested in  participating in a clinical trial. She is ready to consider adding an additional treatment if her labs remain elevated.   Her fibroscan revealed F 2. her CT scan was negative.  She denies any further abdominal pain.  She does complain of reflux that is intermittent in nature and she denies dysphagia.  She denies any changes in her bowel habits, melena or hematochezia.  Her next colonoscopy is due 01/2027.  no

## 2024-11-15 NOTE — PROGRESS NOTE ADULT - SUBJECTIVE AND OBJECTIVE BOX
Sinus rhythm with a heart rate of 55.  138 QRS 92 QTc 396 sinus bradycardia right axis deviation no elevations or depressions Department of Cardiology                                                                  [facility]/[place] [address] [address] [facility]                                                            Telephone: [phone]. Fax:[phone]                                                                           Cardiac Catheterization Note       Narrative:  84y  Female is now s/p left heart catheterization via left radial approach (no site complications) with Dr. Chua which showed:  mRCA 60%   EF 30%    Dr. hCua discussed with Dr. Quiroz (hospitalist)  To be discussed with Dr. Colby; recommendations include JACKSON for further evaluation         PAST MEDICAL & SURGICAL HISTORY:  Diverticulosis  Hiatal hernia  Cholelithiasis  Syncope and collapse  History of aortic aneurysm  Bilateral cataracts    FAMILY HISTORY:  No pertinent family history in first degree relatives    Home Medications:  Fish Oil 500 mg oral capsule: 2 cap(s) orally once a day (17 May 2019 12:51)  Vitamin B12 250 mcg oral tablet: 1 tab(s) orally once a day (17 May 2019 12:51)    Patient is a 84y old  Female who presents with a chief complaint of Syncope (17 May 2019 13:01)      HPI:  84yoF hx thoracic aortic aneurysm, active smoker presenting with syncope.  Collateral hx obtained from daughter Tatiana at bedside ([phone]).  Pt was at [facility] with her family earlier today, was sitting at a table when her head dropped down and she stopped responding to them. Her family noticed that her 'eyes looked fixed'.  There were 2 nurses nearby who then placed pt on floor and began CPR without checking for a pulse and after about 5-10 seconds, pt woke up and was talking.  Pt reports that prior to the episode, she felt her 'knees buckling' a few times while walking to the today.  Pt notes that 'she didn't eat anything all day'.  She denies any lightheadedness, chest pain, dyspnea, abdominal pain, nausea, vomiting prior to the event.  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  No aortic rupture or dissection on imaging.  EKG: NSR, HR 95, TWI in V4 to V6 not seen on previous EKG. (17 May 2019 03:22)    General: No fatigue, no fevers/chills  Respiratory: No dyspnea, no cough, no wheeze  CV: No chest pain, no palpitations  Abd: No nausea  Neuro: No headache, no dizziness  No Known Allergies      Objective:  Vital Signs Last 24 Hrs  T(C): 36.7 (17 May 2019 12:37), Max: 36.8 (16 May 2019 22:26)  T(F): 98 (17 May 2019 12:37), Max: 98.3 (16 May 2019 22:26)  HR: 76 (17 May 2019 16:00) (76 - 105)  BP: 153/75 (17 May 2019 16:00) (106/75 - 153/75)  BP(mean): 106 (17 May 2019 08:00) (106 - 106)  RR: 16 (17 May 2019 16:00) (16 - 40)  SpO2: 95% (17 May 2019 16:00) (94% - 96%)    CM: SR  Neuro: A&OX3, CN 2-12 intact  HEENT: NC, AT  Lungs: CTA B/L  CV: S1, S2, no murmur, RRR  Abd: Soft  Right Groin: Soft, no bleeding, no hematoma  Extremity: right radial site; palpable pulse; free from hematoma, cap refill <3sec, fingers mobile,+ distal pulses                          11.5   9.0   )-----------( 250      ( 16 May 2019 22:58 )             35.8     05-17    134<L>  |  96<L>  |  10.0  ----------------------------<  91  3.4<L>   |  26.0  |  0.33<L>    Ca    8.1<L>      17 May 2019 07:06  Mg     1.8     05-17    TPro  6.8  /  Alb  3.3  /  TBili  0.4  /  DBili  x   /  AST  43<H>  /  ALT  24  /  AlkPhos  75  05-16  PT/INR - ( 16 May 2019 22:58 )   PT: 12.3 sec;   INR: 1.07 ratio    PTT - ( 16 May 2019 22:58 )  PTT:23.9 sec    -post cardiac cath orders  -right radial  precautions  -continue current medical therapy  -official report pending  -Dr. Quiroz d/w Dr. Chua; rec. JACKSON Department of Cardiology                                                                  [facility]/[place] [address] [address] [facility]                                                            Telephone: [phone]. Fax:[phone]                                                                           Cardiac Catheterization Note       Narrative:  84y  Female is now s/p left heart catheterization via left radial approach (no site complications) with Dr. Chua which showed:  mRCA 60%   EF 30%    Dr. Chua discussed with Dr. Quiroz (hospitalist)  To be discussed with Dr. Colby; recommendations include JACKSON for further evaluation; CTS consult         PAST MEDICAL & SURGICAL HISTORY:  Diverticulosis  Hiatal hernia  Cholelithiasis  Syncope and collapse  History of aortic aneurysm  Bilateral cataracts    FAMILY HISTORY:  No pertinent family history in first degree relatives    Home Medications:  Fish Oil 500 mg oral capsule: 2 cap(s) orally once a day (17 May 2019 12:51)  Vitamin B12 250 mcg oral tablet: 1 tab(s) orally once a day (17 May 2019 12:51)    Patient is a 84y old  Female who presents with a chief complaint of Syncope (17 May 2019 13:01)      HPI:  84yoF hx thoracic aortic aneurysm, active smoker presenting with syncope.  Collateral hx obtained from daughter Tatiana at bedside ([phone]).  Pt was at [facility] with her family earlier today, was sitting at a table when her head dropped down and she stopped responding to them. Her family noticed that her 'eyes looked fixed'.  There were 2 nurses nearby who then placed pt on floor and began CPR without checking for a pulse and after about 5-10 seconds, pt woke up and was talking.  Pt reports that prior to the episode, she felt her 'knees buckling' a few times while walking to the today.  Pt notes that 'she didn't eat anything all day'.  She denies any lightheadedness, chest pain, dyspnea, abdominal pain, nausea, vomiting prior to the event.  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  No aortic rupture or dissection on imaging.  EKG: NSR, HR 95, TWI in V4 to V6 not seen on previous EKG. (17 May 2019 03:22)    General: No fatigue, no fevers/chills  Respiratory: No dyspnea, no cough, no wheeze  CV: No chest pain, no palpitations  Abd: No nausea  Neuro: No headache, no dizziness  No Known Allergies      Objective:  Vital Signs Last 24 Hrs  T(C): 36.7 (17 May 2019 12:37), Max: 36.8 (16 May 2019 22:26)  T(F): 98 (17 May 2019 12:37), Max: 98.3 (16 May 2019 22:26)  HR: 76 (17 May 2019 16:00) (76 - 105)  BP: 153/75 (17 May 2019 16:00) (106/75 - 153/75)  BP(mean): 106 (17 May 2019 08:00) (106 - 106)  RR: 16 (17 May 2019 16:00) (16 - 40)  SpO2: 95% (17 May 2019 16:00) (94% - 96%)    CM: SR  Neuro: A&OX3, CN 2-12 intact  HEENT: NC, AT  Lungs: CTA B/L  CV: S1, S2, no murmur, RRR  Abd: Soft  Right Groin: Soft, no bleeding, no hematoma  Extremity: right radial site; palpable pulse; free from hematoma, cap refill <3sec, fingers mobile,+ distal pulses                          11.5   9.0   )-----------( 250      ( 16 May 2019 22:58 )             35.8     05-17    134<L>  |  96<L>  |  10.0  ----------------------------<  91  3.4<L>   |  26.0  |  0.33<L>    Ca    8.1<L>      17 May 2019 07:06  Mg     1.8     05-17    TPro  6.8  /  Alb  3.3  /  TBili  0.4  /  DBili  x   /  AST  43<H>  /  ALT  24  /  AlkPhos  75  05-16  PT/INR - ( 16 May 2019 22:58 )   PT: 12.3 sec;   INR: 1.07 ratio    PTT - ( 16 May 2019 22:58 )  PTT:23.9 sec    -post cardiac cath orders  -right radial  precautions  -continue current medical therapy  -official report pending  -Dr. Quiroz d/w Dr. Chua; rec. JACKSON  -CTS consult

## 2025-01-27 NOTE — DISCHARGE NOTE NURSING/CASE MANAGEMENT/SOCIAL WORK - NSDCDPATPORTLINK_GEN_ALL_CORE
Patient : Carmelina Howe Age: 65 year old Sex: female   MRN: [id number] Encounter Date: 1/27/2025    History     Chief Complaint   Patient presents with    Fall    Foot Pain    Foot Injury       HPI    Carmelina Howe is a 65 year old presenting to the emergency department after a mechanical fall. She notes she missed a step and landed on her left foot. She was able to hop to her room at first but she is no longer able to walk on it. She currently has swelling on her medial, dorsal and lateral foot that is limiting ROM.       Past/Family/Social History     Allergies   Allergen Reactions    Naproxen DIARRHEA     Tolerates aspirin       No current facility-administered medications for this encounter.     Current Outpatient Medications   Medication Sig    fluticasone-salmeterol 250-50 MCG/ACT inhaler INHALE 1 PUFF INTO THE LUNGS IN THE MORNING AND IN THE EVENING    lisinopril (ZESTRIL) 5 MG tablet Take 1 tablet by mouth daily.    clopidogrel (PLAVIX) 75 MG tablet Take 1 tablet by mouth daily.    furosemide (LASIX) 20 MG tablet Take 1 tablet by mouth daily.    metoPROLOL succinate (TOPROL-XL) 25 MG 24 hr tablet Take 1 tablet by mouth in the morning and 1 tablet in the evening.    atorvastatin (LIPITOR) 40 MG tablet Take 1 tablet by mouth daily.    omeprazole (PriLOSEC) 40 MG capsule Take 1 capsule by mouth daily.    aspirin 81 MG chewable tablet Chew 1 tablet by mouth daily. Do not start before November 28, 2023.    acetaminophen (TYLENOL) 500 MG tablet Take 2 tablets by mouth every 6 hours as needed for Pain.       Past Medical History:   Diagnosis Date    Arthritis     Failed moderate sedation during procedure     woke during colonoscopy several times        Past Surgical History:   Procedure Laterality Date    Colonoscopy w/ biopsies and polypectomy  7/13/2015    with clipping    Colonoscopy w/ polypectomy  10/2/2015    tissue ablation and clipping    Tubal ligation      Vaginal delivery      x2       Family History    Problem Relation Age of Onset    Parkinsonism Father     Psychiatric Father     Diabetes Father     Heart disease Sister         CABG     Hyperlipidemia Sister     Hyperlipidemia Brother     Substance Abuse Sister     Substance Abuse Sister     Hyperlipidemia Sister     HIV Brother     Early death Daughter 36        Overdose     Substance Abuse Daughter        Social History     Tobacco Use    Smoking status: Former     Current packs/day: 0.00     Average packs/day: 1 pack/day for 22.0 years (22.0 ttl pk-yrs)     Types: Cigarettes     Start date: 2001     Quit date: 2023     Years since quittin.2    Smokeless tobacco: Never    Tobacco comments:     Stopped smoking after stent placed   Vaping Use    Vaping status: never used   Substance Use Topics    Alcohol use: Not Currently    Drug use: Not Currently          Review of Systems   Review of Symptoms     Review of Systems   Constitutional:  Negative for activity change, chills, diaphoresis, fatigue and fever.   Respiratory:  Negative for shortness of breath.    Cardiovascular:  Negative for chest pain and palpitations.   Gastrointestinal:  Negative for nausea and vomiting.   Musculoskeletal:  Negative for arthralgias and myalgias.   Skin:  Negative for rash.   Neurological:  Negative for dizziness, seizures, syncope, light-headedness and headaches.   Psychiatric/Behavioral:  Negative for sleep disturbance. The patient is not nervous/anxious.           Physical Exam   Physical Exam     ED Triage Vitals [25 0819]   ED Triage Vitals Group      Temp       Pulse       Resp       BP       SpO2 95 %      EtCO2 mmHg       Height       Weight       Weight Scale Used       BMI (Calculated)       IBW/kg (Calculated)        Physical Exam  Vitals and nursing note reviewed.   Constitutional:       General: She is not in acute distress.     Appearance: Normal appearance. She is well-groomed. She is not ill-appearing, toxic-appearing or diaphoretic.    Musculoskeletal:      Right lower leg: No edema.      Left lower leg: No edema.      Right foot: Normal.      Left foot: Decreased range of motion. Swelling and tenderness (Lateral proximal MTP and medial prox MTP) present. Normal pulse.   Neurological:      Mental Status: She is alert.   Psychiatric:         Attention and Perception: Attention and perception normal.         Mood and Affect: Mood and affect normal.         Speech: Speech normal.         Behavior: Behavior normal. Behavior is cooperative.         Thought Content: Thought content normal.         Cognition and Memory: Cognition and memory normal.         Judgment: Judgment normal.            Procedures   ED Procedures     Procedures     Lab Results   ED Lab   No results found for this visit on 01/27/25.       EKG     No EKG performed    Radiology Results   ED Radiology Results     Imaging Results              XR FOOT 3 OR MORE VIEWS LEFT (Final result)  Result time 01/27/25 08:50:09      Final result                   Impression:    IMPRESSION:    No acute fracture or dislocation of the left ankle.    The ankle mortise is not widened.    No acute fracture or dislocation of the left foot.    No radiopaque foreign bodies noted.        Electronically Signed by: Davi Tapia MD  Signed on: 1/27/2025 8:50 AM  Created on Workstation ID: [id number]  Signed on Workstation ID: [id number]               Narrative:    EXAM: XR FOOT 3 OR MORE VIEWS LEFT, XR ANKLE MIN 3 VIEWS LEFT    CLINICAL HISTORY: fall, swelling    COMPARISON: None.                                       XR ANKLE MIN 3 VIEWS LEFT (Final result)  Result time 01/27/25 08:50:09      Final result                   Impression:    IMPRESSION:    No acute fracture or dislocation of the left ankle.    The ankle mortise is not widened.    No acute fracture or dislocation of the left foot.    No radiopaque foreign bodies noted.        Electronically Signed by: Davi Tapia MD  Signed on:  1/27/2025 8:50 AM  Created on Workstation ID: [id number]  Signed on Workstation ID: [id number]               Narrative:    EXAM: XR FOOT 3 OR MORE VIEWS LEFT, XR ANKLE MIN 3 VIEWS LEFT    CLINICAL HISTORY: fall, swelling    COMPARISON: None.                                           ED Medications   ED Medications     ED Medication Orders (From admission, onward)      None                 ED Course     Vitals:    01/27/25 0819   SpO2: 95%       ED Course as of 01/27/25 0904   Mon Jan 27, 2025   0846 XR FOOT 3 OR MORE VIEWS LEFT  No definitive fracture on my independent interpretation pending final radiology report. []   0846 XR FOOT 3 OR MORE VIEWS LEFT  No fracture visualized on wet read, will wait for radiologist's official read [CL]      ED Course User Index  [CL] Godwin Peralta MD  [] Jojo Guerra MD       Radiology Review: I have independently interpreted the  L ankl and foot XR and have found No acute fracture .  I am awaiting on the final radiology read.        Consults                  Medical Decision Making                                Patient is a 65 year old with complaint of left ankle pain.  My differential diagnosis includes but is not limited to sprain vs fracture. Physical exam slightly specific, but XR was negative. Given a post op shoe and advised on pain control. The patient will not require admission.     Does the Patient have sepsis: NO     Critical Care         Disposition       Clinical Impression and Diagnosis  9:19 AM       ED Diagnosis       Diagnosis Comment Associated Orders       Final diagnosis    Injury of left foot, initial encounter -- --            Follow Up:  Michael Goins MD  [address] [facility] [address] [phone]                Summary of your Discharge Medications      You have not been prescribed any medications.         Pt is discharged to home/self care in stable condition.              Discharge 1/27/2025  8:54 AM  Carmelina Hwoe discharge to  home/self care.                  The patient case was seen and discussed with the attending physician Dr. Jojo Guerra. The assessment and plan was agreed upon.    Godwin Peralta MD  Family Medicine, PGY-3  01/27/25       Godwin Peralta MD  Resident  01/27/25 [id number]     You can access the [facility] Patient Portal, offered by [facility], by registering with the following website: [email or web address]